# Patient Record
Sex: FEMALE | Race: WHITE
[De-identification: names, ages, dates, MRNs, and addresses within clinical notes are randomized per-mention and may not be internally consistent; named-entity substitution may affect disease eponyms.]

---

## 2019-12-05 ENCOUNTER — HOSPITAL ENCOUNTER (EMERGENCY)
Age: 59
Discharge: HOME | End: 2019-12-05
Payer: COMMERCIAL

## 2019-12-05 VITALS
OXYGEN SATURATION: 96 % | RESPIRATION RATE: 16 BRPM | DIASTOLIC BLOOD PRESSURE: 56 MMHG | TEMPERATURE: 98.06 F | SYSTOLIC BLOOD PRESSURE: 166 MMHG | HEART RATE: 83 BPM

## 2019-12-05 VITALS — RESPIRATION RATE: 16 BRPM

## 2019-12-05 VITALS — BODY MASS INDEX: 33.3 KG/M2
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
## 2019-12-05 DIAGNOSIS — S60.212A: Primary | ICD-10-CM
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
## 2019-12-05 PROCEDURE — 99283 EMERGENCY DEPT VISIT LOW MDM: CPT

## 2019-12-05 PROCEDURE — 73110 X-RAY EXAM OF WRIST: CPT

## 2023-03-17 ENCOUNTER — TELEPHONE (OUTPATIENT)
Dept: PRIMARY CARE | Facility: CLINIC | Age: 63
End: 2023-03-17
Payer: COMMERCIAL

## 2023-03-17 DIAGNOSIS — F32.A DEPRESSION, UNSPECIFIED DEPRESSION TYPE: ICD-10-CM

## 2023-03-17 DIAGNOSIS — F41.9 ANXIETY: ICD-10-CM

## 2023-03-17 PROBLEM — E78.5 HYPERLIPIDEMIA: Status: ACTIVE | Noted: 2023-03-17

## 2023-03-17 PROBLEM — E66.01 CLASS 2 SEVERE OBESITY WITH SERIOUS COMORBIDITY AND BODY MASS INDEX (BMI) OF 36.0 TO 36.9 IN ADULT (MULTI): Status: ACTIVE | Noted: 2023-03-17

## 2023-03-17 PROBLEM — L98.9 SKIN LESION OF CHEST WALL: Status: ACTIVE | Noted: 2023-03-17

## 2023-03-17 PROBLEM — S93.409A ANKLE SPRAIN: Status: ACTIVE | Noted: 2023-03-17

## 2023-03-17 PROBLEM — L30.0 NUMMULAR ECZEMA: Status: ACTIVE | Noted: 2023-03-17

## 2023-03-17 PROBLEM — R92.8 ABNORMAL MAMMOGRAM: Status: ACTIVE | Noted: 2023-03-17

## 2023-03-17 PROBLEM — K21.9 GERD (GASTROESOPHAGEAL REFLUX DISEASE): Status: ACTIVE | Noted: 2023-03-17

## 2023-03-17 PROBLEM — E66.812 CLASS 2 SEVERE OBESITY WITH SERIOUS COMORBIDITY AND BODY MASS INDEX (BMI) OF 36.0 TO 36.9 IN ADULT: Status: ACTIVE | Noted: 2023-03-17

## 2023-03-17 RX ORDER — DESVENLAFAXINE 50 MG/1
1 TABLET, EXTENDED RELEASE ORAL DAILY
COMMUNITY
Start: 2018-07-17 | End: 2023-03-22 | Stop reason: SDUPTHER

## 2023-03-17 RX ORDER — BUSPIRONE HYDROCHLORIDE 15 MG/1
1 TABLET ORAL 2 TIMES DAILY
COMMUNITY
Start: 2021-07-08 | End: 2023-03-17 | Stop reason: SDUPTHER

## 2023-03-17 RX ORDER — OMEPRAZOLE 20 MG/1
1 CAPSULE, DELAYED RELEASE ORAL DAILY
COMMUNITY
Start: 2019-06-21 | End: 2023-03-22 | Stop reason: SDUPTHER

## 2023-03-18 RX ORDER — BUSPIRONE HYDROCHLORIDE 15 MG/1
15 TABLET ORAL 2 TIMES DAILY
Qty: 60 TABLET | Refills: 0 | Status: SHIPPED | OUTPATIENT
Start: 2023-03-18 | End: 2023-03-22 | Stop reason: SDUPTHER

## 2023-03-22 ENCOUNTER — LAB (OUTPATIENT)
Dept: LAB | Facility: LAB | Age: 63
End: 2023-03-22
Payer: COMMERCIAL

## 2023-03-22 ENCOUNTER — OFFICE VISIT (OUTPATIENT)
Dept: PRIMARY CARE | Facility: CLINIC | Age: 63
End: 2023-03-22
Payer: COMMERCIAL

## 2023-03-22 VITALS
HEART RATE: 87 BPM | DIASTOLIC BLOOD PRESSURE: 78 MMHG | WEIGHT: 230 LBS | TEMPERATURE: 98.4 F | SYSTOLIC BLOOD PRESSURE: 130 MMHG

## 2023-03-22 DIAGNOSIS — F41.9 ANXIETY: ICD-10-CM

## 2023-03-22 DIAGNOSIS — F32.A DEPRESSION, UNSPECIFIED DEPRESSION TYPE: ICD-10-CM

## 2023-03-22 DIAGNOSIS — K21.9 GASTROESOPHAGEAL REFLUX DISEASE WITHOUT ESOPHAGITIS: Primary | ICD-10-CM

## 2023-03-22 DIAGNOSIS — Z12.31 BREAST CANCER SCREENING BY MAMMOGRAM: ICD-10-CM

## 2023-03-22 DIAGNOSIS — Z13.220 SCREENING CHOLESTEROL LEVEL: ICD-10-CM

## 2023-03-22 DIAGNOSIS — F33.42 RECURRENT MAJOR DEPRESSIVE DISORDER, IN FULL REMISSION (CMS-HCC): ICD-10-CM

## 2023-03-22 DIAGNOSIS — K21.9 GASTROESOPHAGEAL REFLUX DISEASE WITHOUT ESOPHAGITIS: ICD-10-CM

## 2023-03-22 PROCEDURE — 80061 LIPID PANEL: CPT

## 2023-03-22 PROCEDURE — 84443 ASSAY THYROID STIM HORMONE: CPT

## 2023-03-22 PROCEDURE — 99214 OFFICE O/P EST MOD 30 MIN: CPT | Performed by: FAMILY MEDICINE

## 2023-03-22 PROCEDURE — 36415 COLL VENOUS BLD VENIPUNCTURE: CPT

## 2023-03-22 PROCEDURE — 85025 COMPLETE CBC W/AUTO DIFF WBC: CPT

## 2023-03-22 PROCEDURE — 1036F TOBACCO NON-USER: CPT | Performed by: FAMILY MEDICINE

## 2023-03-22 PROCEDURE — 80053 COMPREHEN METABOLIC PANEL: CPT

## 2023-03-22 RX ORDER — DESVENLAFAXINE 50 MG/1
50 TABLET, EXTENDED RELEASE ORAL DAILY
Qty: 90 TABLET | Refills: 1 | Status: SHIPPED | OUTPATIENT
Start: 2023-03-22 | End: 2023-10-30 | Stop reason: SDUPTHER

## 2023-03-22 RX ORDER — BUSPIRONE HYDROCHLORIDE 15 MG/1
15 TABLET ORAL 2 TIMES DAILY
Qty: 180 TABLET | Refills: 1 | Status: SHIPPED | OUTPATIENT
Start: 2023-03-22 | End: 2023-09-18 | Stop reason: SDUPTHER

## 2023-03-22 RX ORDER — VENLAFAXINE 75 MG/1
75 TABLET ORAL DAILY
COMMUNITY
End: 2023-03-22 | Stop reason: ALTCHOICE

## 2023-03-22 RX ORDER — OMEPRAZOLE 20 MG/1
20 CAPSULE, DELAYED RELEASE ORAL DAILY
Qty: 90 CAPSULE | Refills: 1 | Status: SHIPPED | OUTPATIENT
Start: 2023-03-22 | End: 2023-09-18 | Stop reason: ALTCHOICE

## 2023-03-22 RX ORDER — DABIGATRAN ETEXILATE 150 MG/1
150 CAPSULE ORAL 2 TIMES DAILY
COMMUNITY
End: 2023-03-22 | Stop reason: ENTERED-IN-ERROR

## 2023-03-22 RX ORDER — IBUPROFEN 600 MG/1
1 TABLET ORAL EVERY 6 HOURS PRN
COMMUNITY
Start: 2022-05-16

## 2023-03-22 NOTE — PROGRESS NOTES
Subjective   Patient ID: Mia Estrella is a 62 y.o. female who presents for Follow-up (Present for follow up on depresssion and Gerd).  HPI  Feels well    Anxiety controlled    Does report episodes of dysphagia/food getting stuck.   Review of Systems  Genl: The patient has been in good health without recent weight change, fevers, or night sweats  CVS: No chest pain, irregular heartbeat, shortness of breath, or swollen ankles  Resp: No cough or difficulty breathing  GI: No change in bowel habits or abdominal pain. No heartburn  : No urinary problems.   Objective   Visit Vitals  /78   Pulse 87   Temp 36.9 °C (98.4 °F) (Oral)      Physical Exam  Alert, well-appearing    CVS RRR, no murmurs    Resp clear    Abd soft, NT, no masses or HSM   Assessment/Plan   Diagnoses and all orders for this visit:  Gastroesophageal reflux disease without esophagitis  -     CBC and Auto Differential; Future  -     Comprehensive Metabolic Panel; Future  -     omeprazole (PriLOSEC) 20 mg DR capsule; Take 1 capsule (20 mg) by mouth once daily.  Recurrent major depressive disorder, in full remission (CMS/HCC)  -     TSH with reflex to Free T4 if abnormal; Future  Anxiety  -     TSH with reflex to Free T4 if abnormal; Future  -     busPIRone (Buspar) 15 mg tablet; Take 1 tablet (15 mg) by mouth in the morning and 1 tablet (15 mg) before bedtime.  Screening cholesterol level  -     Lipid Panel; Future  Breast cancer screening by mammogram  -     BI mammo bilateral screening tomosynthesis; Future  Depression, unspecified depression type  -     busPIRone (Buspar) 15 mg tablet; Take 1 tablet (15 mg) by mouth in the morning and 1 tablet (15 mg) before bedtime.  -     desvenlafaxine 50 mg 24 hr tablet; Take 1 tablet (50 mg) by mouth once daily.    Referral to GI for further eval of dysphagia/GERD      Tanya Thurston MD  Family Medicine   [facility]

## 2023-03-23 LAB
ALANINE AMINOTRANSFERASE (SGPT) (U/L) IN SER/PLAS: 19 U/L (ref 7–45)
ALBUMIN (G/DL) IN SER/PLAS: 4.3 G/DL (ref 3.4–5)
ALKALINE PHOSPHATASE (U/L) IN SER/PLAS: 102 U/L (ref 33–136)
ANION GAP IN SER/PLAS: 12 MMOL/L (ref 10–20)
ASPARTATE AMINOTRANSFERASE (SGOT) (U/L) IN SER/PLAS: 19 U/L (ref 9–39)
BASOPHILS (10*3/UL) IN BLOOD BY AUTOMATED COUNT: 0.05 X10E9/L (ref 0–0.1)
BASOPHILS/100 LEUKOCYTES IN BLOOD BY AUTOMATED COUNT: 1 % (ref 0–2)
BILIRUBIN TOTAL (MG/DL) IN SER/PLAS: 0.5 MG/DL (ref 0–1.2)
CALCIUM (MG/DL) IN SER/PLAS: 9.5 MG/DL (ref 8.6–10.6)
CARBON DIOXIDE, TOTAL (MMOL/L) IN SER/PLAS: 31 MMOL/L (ref 21–32)
CHLORIDE (MMOL/L) IN SER/PLAS: 105 MMOL/L (ref 98–107)
CHOLESTEROL (MG/DL) IN SER/PLAS: 230 MG/DL (ref 0–199)
CHOLESTEROL IN HDL (MG/DL) IN SER/PLAS: 68.4 MG/DL
CHOLESTEROL/HDL RATIO: 3.4
CREATININE (MG/DL) IN SER/PLAS: 0.72 MG/DL (ref 0.5–1.05)
EOSINOPHILS (10*3/UL) IN BLOOD BY AUTOMATED COUNT: 0.12 X10E9/L (ref 0–0.7)
EOSINOPHILS/100 LEUKOCYTES IN BLOOD BY AUTOMATED COUNT: 2.3 % (ref 0–6)
ERYTHROCYTE DISTRIBUTION WIDTH (RATIO) BY AUTOMATED COUNT: 13 % (ref 11.5–14.5)
ERYTHROCYTE MEAN CORPUSCULAR HEMOGLOBIN CONCENTRATION (G/DL) BY AUTOMATED: 32.4 G/DL (ref 32–36)
ERYTHROCYTE MEAN CORPUSCULAR VOLUME (FL) BY AUTOMATED COUNT: 93 FL (ref 80–100)
ERYTHROCYTES (10*6/UL) IN BLOOD BY AUTOMATED COUNT: 4.42 X10E12/L (ref 4–5.2)
GFR FEMALE: >90 ML/MIN/1.73M2
GLUCOSE (MG/DL) IN SER/PLAS: 99 MG/DL (ref 74–99)
HEMATOCRIT (%) IN BLOOD BY AUTOMATED COUNT: 41.1 % (ref 36–46)
HEMOGLOBIN (G/DL) IN BLOOD: 13.3 G/DL (ref 12–16)
IMMATURE GRANULOCYTES/100 LEUKOCYTES IN BLOOD BY AUTOMATED COUNT: 0.4 % (ref 0–0.9)
LDL: 126 MG/DL (ref 0–99)
LEUKOCYTES (10*3/UL) IN BLOOD BY AUTOMATED COUNT: 5.3 X10E9/L (ref 4.4–11.3)
LYMPHOCYTES (10*3/UL) IN BLOOD BY AUTOMATED COUNT: 1.95 X10E9/L (ref 1.2–4.8)
LYMPHOCYTES/100 LEUKOCYTES IN BLOOD BY AUTOMATED COUNT: 37.1 % (ref 13–44)
MONOCYTES (10*3/UL) IN BLOOD BY AUTOMATED COUNT: 0.51 X10E9/L (ref 0.1–1)
MONOCYTES/100 LEUKOCYTES IN BLOOD BY AUTOMATED COUNT: 9.7 % (ref 2–10)
NEUTROPHILS (10*3/UL) IN BLOOD BY AUTOMATED COUNT: 2.6 X10E9/L (ref 1.2–7.7)
NEUTROPHILS/100 LEUKOCYTES IN BLOOD BY AUTOMATED COUNT: 49.5 % (ref 40–80)
NRBC (PER 100 WBCS) BY AUTOMATED COUNT: 0 /100 WBC (ref 0–0)
PLATELETS (10*3/UL) IN BLOOD AUTOMATED COUNT: 195 X10E9/L (ref 150–450)
POTASSIUM (MMOL/L) IN SER/PLAS: 4.2 MMOL/L (ref 3.5–5.3)
PROTEIN TOTAL: 7.1 G/DL (ref 6.4–8.2)
SODIUM (MMOL/L) IN SER/PLAS: 144 MMOL/L (ref 136–145)
THYROTROPIN (MIU/L) IN SER/PLAS BY DETECTION LIMIT <= 0.05 MIU/L: 1.59 MIU/L (ref 0.44–3.98)
TRIGLYCERIDE (MG/DL) IN SER/PLAS: 177 MG/DL (ref 0–149)
UREA NITROGEN (MG/DL) IN SER/PLAS: 10 MG/DL (ref 6–23)
VLDL: 35 MG/DL (ref 0–40)

## 2023-04-17 ENCOUNTER — OFFICE (OUTPATIENT)
Dept: URBAN - METROPOLITAN AREA CLINIC 27 | Facility: CLINIC | Age: 63
End: 2023-04-17

## 2023-04-17 VITALS
WEIGHT: 228 LBS | DIASTOLIC BLOOD PRESSURE: 73 MMHG | SYSTOLIC BLOOD PRESSURE: 139 MMHG | HEIGHT: 64 IN | TEMPERATURE: 97.7 F | HEART RATE: 71 BPM

## 2023-04-17 DIAGNOSIS — K21.9 GASTRO-ESOPHAGEAL REFLUX DISEASE WITHOUT ESOPHAGITIS: ICD-10-CM

## 2023-04-17 DIAGNOSIS — R13.10 DYSPHAGIA, UNSPECIFIED: ICD-10-CM

## 2023-04-17 PROCEDURE — 99204 OFFICE O/P NEW MOD 45 MIN: CPT | Performed by: INTERNAL MEDICINE

## 2023-04-18 ENCOUNTER — OFFICE (OUTPATIENT)
Dept: URBAN - METROPOLITAN AREA PATHOLOGY 2 | Facility: PATHOLOGY | Age: 63
End: 2023-04-18
Payer: COMMERCIAL

## 2023-04-18 ENCOUNTER — AMBULATORY SURGICAL CENTER (OUTPATIENT)
Dept: URBAN - METROPOLITAN AREA SURGERY 12 | Facility: SURGERY | Age: 63
End: 2023-04-18

## 2023-04-18 VITALS
OXYGEN SATURATION: 94 % | HEART RATE: 66 BPM | TEMPERATURE: 98.2 F | DIASTOLIC BLOOD PRESSURE: 80 MMHG | DIASTOLIC BLOOD PRESSURE: 79 MMHG | HEART RATE: 94 BPM | RESPIRATION RATE: 33 BRPM | RESPIRATION RATE: 20 BRPM | SYSTOLIC BLOOD PRESSURE: 135 MMHG | HEART RATE: 70 BPM | TEMPERATURE: 98.2 F | OXYGEN SATURATION: 56 % | HEART RATE: 79 BPM | SYSTOLIC BLOOD PRESSURE: 128 MMHG | DIASTOLIC BLOOD PRESSURE: 78 MMHG | DIASTOLIC BLOOD PRESSURE: 77 MMHG | HEART RATE: 77 BPM | SYSTOLIC BLOOD PRESSURE: 124 MMHG | OXYGEN SATURATION: 97 % | DIASTOLIC BLOOD PRESSURE: 66 MMHG | DIASTOLIC BLOOD PRESSURE: 47 MMHG | SYSTOLIC BLOOD PRESSURE: 135 MMHG | SYSTOLIC BLOOD PRESSURE: 158 MMHG | DIASTOLIC BLOOD PRESSURE: 77 MMHG | DIASTOLIC BLOOD PRESSURE: 66 MMHG | RESPIRATION RATE: 29 BRPM | RESPIRATION RATE: 13 BRPM | SYSTOLIC BLOOD PRESSURE: 128 MMHG | HEART RATE: 64 BPM | RESPIRATION RATE: 33 BRPM | RESPIRATION RATE: 18 BRPM | SYSTOLIC BLOOD PRESSURE: 130 MMHG | OXYGEN SATURATION: 83 % | SYSTOLIC BLOOD PRESSURE: 114 MMHG | OXYGEN SATURATION: 96 % | HEART RATE: 94 BPM | DIASTOLIC BLOOD PRESSURE: 51 MMHG | HEART RATE: 77 BPM | HEART RATE: 66 BPM | HEART RATE: 84 BPM | OXYGEN SATURATION: 94 % | HEART RATE: 70 BPM | HEART RATE: 83 BPM | SYSTOLIC BLOOD PRESSURE: 109 MMHG | SYSTOLIC BLOOD PRESSURE: 124 MMHG | WEIGHT: 226 LBS | HEART RATE: 94 BPM | HEART RATE: 64 BPM | OXYGEN SATURATION: 62 % | SYSTOLIC BLOOD PRESSURE: 114 MMHG | HEART RATE: 84 BPM | SYSTOLIC BLOOD PRESSURE: 130 MMHG | SYSTOLIC BLOOD PRESSURE: 114 MMHG | SYSTOLIC BLOOD PRESSURE: 133 MMHG | RESPIRATION RATE: 9 BRPM | RESPIRATION RATE: 9 BRPM | SYSTOLIC BLOOD PRESSURE: 130 MMHG | OXYGEN SATURATION: 56 % | DIASTOLIC BLOOD PRESSURE: 51 MMHG | OXYGEN SATURATION: 99 % | OXYGEN SATURATION: 92 % | HEART RATE: 83 BPM | RESPIRATION RATE: 20 BRPM | DIASTOLIC BLOOD PRESSURE: 79 MMHG | SYSTOLIC BLOOD PRESSURE: 109 MMHG | DIASTOLIC BLOOD PRESSURE: 80 MMHG | OXYGEN SATURATION: 83 % | WEIGHT: 226 LBS | DIASTOLIC BLOOD PRESSURE: 83 MMHG | HEART RATE: 97 BPM | SYSTOLIC BLOOD PRESSURE: 124 MMHG | DIASTOLIC BLOOD PRESSURE: 47 MMHG | OXYGEN SATURATION: 99 % | SYSTOLIC BLOOD PRESSURE: 148 MMHG | HEART RATE: 70 BPM | SYSTOLIC BLOOD PRESSURE: 137 MMHG | HEART RATE: 97 BPM | HEIGHT: 64 IN | DIASTOLIC BLOOD PRESSURE: 67 MMHG | RESPIRATION RATE: 20 BRPM | RESPIRATION RATE: 18 BRPM | WEIGHT: 226 LBS | DIASTOLIC BLOOD PRESSURE: 67 MMHG | OXYGEN SATURATION: 94 % | OXYGEN SATURATION: 96 % | DIASTOLIC BLOOD PRESSURE: 78 MMHG | OXYGEN SATURATION: 92 % | OXYGEN SATURATION: 62 % | DIASTOLIC BLOOD PRESSURE: 78 MMHG | RESPIRATION RATE: 29 BRPM | DIASTOLIC BLOOD PRESSURE: 80 MMHG | TEMPERATURE: 98.2 F | SYSTOLIC BLOOD PRESSURE: 133 MMHG | DIASTOLIC BLOOD PRESSURE: 77 MMHG | SYSTOLIC BLOOD PRESSURE: 137 MMHG | HEART RATE: 83 BPM | HEART RATE: 79 BPM | DIASTOLIC BLOOD PRESSURE: 83 MMHG | RESPIRATION RATE: 29 BRPM | HEIGHT: 64 IN | OXYGEN SATURATION: 99 % | OXYGEN SATURATION: 56 % | SYSTOLIC BLOOD PRESSURE: 135 MMHG | HEART RATE: 79 BPM | HEART RATE: 77 BPM | RESPIRATION RATE: 33 BRPM | DIASTOLIC BLOOD PRESSURE: 79 MMHG | RESPIRATION RATE: 10 BRPM | HEART RATE: 66 BPM | OXYGEN SATURATION: 92 % | OXYGEN SATURATION: 97 % | DIASTOLIC BLOOD PRESSURE: 67 MMHG | SYSTOLIC BLOOD PRESSURE: 128 MMHG | HEART RATE: 97 BPM | RESPIRATION RATE: 9 BRPM | HEIGHT: 64 IN | RESPIRATION RATE: 13 BRPM | SYSTOLIC BLOOD PRESSURE: 133 MMHG | RESPIRATION RATE: 10 BRPM | OXYGEN SATURATION: 83 % | OXYGEN SATURATION: 97 % | DIASTOLIC BLOOD PRESSURE: 83 MMHG | SYSTOLIC BLOOD PRESSURE: 137 MMHG | SYSTOLIC BLOOD PRESSURE: 109 MMHG | DIASTOLIC BLOOD PRESSURE: 66 MMHG | SYSTOLIC BLOOD PRESSURE: 158 MMHG | RESPIRATION RATE: 13 BRPM | RESPIRATION RATE: 10 BRPM | DIASTOLIC BLOOD PRESSURE: 51 MMHG | SYSTOLIC BLOOD PRESSURE: 148 MMHG | RESPIRATION RATE: 18 BRPM | HEART RATE: 64 BPM | SYSTOLIC BLOOD PRESSURE: 158 MMHG | OXYGEN SATURATION: 62 % | HEART RATE: 84 BPM | SYSTOLIC BLOOD PRESSURE: 148 MMHG | DIASTOLIC BLOOD PRESSURE: 47 MMHG | OXYGEN SATURATION: 96 %

## 2023-04-18 DIAGNOSIS — K22.2 ESOPHAGEAL OBSTRUCTION: ICD-10-CM

## 2023-04-18 DIAGNOSIS — K29.60 OTHER GASTRITIS WITHOUT BLEEDING: ICD-10-CM

## 2023-04-18 DIAGNOSIS — R13.10 DYSPHAGIA, UNSPECIFIED: ICD-10-CM

## 2023-04-18 DIAGNOSIS — K21.9 GASTRO-ESOPHAGEAL REFLUX DISEASE WITHOUT ESOPHAGITIS: ICD-10-CM

## 2023-04-18 DIAGNOSIS — K44.9 DIAPHRAGMATIC HERNIA WITHOUT OBSTRUCTION OR GANGRENE: ICD-10-CM

## 2023-04-18 PROBLEM — K22.89 OTHER SPECIFIED DISEASE OF ESOPHAGUS: Status: ACTIVE | Noted: 2023-04-18

## 2023-04-18 PROCEDURE — 43450 DILATE ESOPHAGUS 1/MULT PASS: CPT | Performed by: INTERNAL MEDICINE

## 2023-04-18 PROCEDURE — 43239 EGD BIOPSY SINGLE/MULTIPLE: CPT | Performed by: INTERNAL MEDICINE

## 2023-04-18 PROCEDURE — 88313 SPECIAL STAINS GROUP 2: CPT | Performed by: PATHOLOGY

## 2023-04-18 PROCEDURE — 88305 TISSUE EXAM BY PATHOLOGIST: CPT | Performed by: PATHOLOGY

## 2023-04-18 PROCEDURE — 88342 IMHCHEM/IMCYTCHM 1ST ANTB: CPT | Performed by: PATHOLOGY

## 2023-04-18 RX ORDER — PANTOPRAZOLE 40 MG/1
TABLET, DELAYED RELEASE ORAL
Qty: 30 | Refills: 1 | Status: ACTIVE

## 2023-09-18 ENCOUNTER — OFFICE VISIT (OUTPATIENT)
Dept: PRIMARY CARE | Facility: CLINIC | Age: 63
End: 2023-09-18
Payer: COMMERCIAL

## 2023-09-18 VITALS — TEMPERATURE: 98.4 F | SYSTOLIC BLOOD PRESSURE: 124 MMHG | DIASTOLIC BLOOD PRESSURE: 82 MMHG | WEIGHT: 227 LBS

## 2023-09-18 DIAGNOSIS — Z23 FLU VACCINE NEED: ICD-10-CM

## 2023-09-18 DIAGNOSIS — F41.9 ANXIETY: ICD-10-CM

## 2023-09-18 DIAGNOSIS — M25.562 ACUTE PAIN OF LEFT KNEE: Primary | ICD-10-CM

## 2023-09-18 DIAGNOSIS — F32.A DEPRESSION, UNSPECIFIED DEPRESSION TYPE: ICD-10-CM

## 2023-09-18 PROCEDURE — 90471 IMMUNIZATION ADMIN: CPT | Performed by: FAMILY MEDICINE

## 2023-09-18 PROCEDURE — 1036F TOBACCO NON-USER: CPT | Performed by: FAMILY MEDICINE

## 2023-09-18 PROCEDURE — 90686 IIV4 VACC NO PRSV 0.5 ML IM: CPT | Performed by: FAMILY MEDICINE

## 2023-09-18 PROCEDURE — 99213 OFFICE O/P EST LOW 20 MIN: CPT | Performed by: FAMILY MEDICINE

## 2023-09-18 RX ORDER — PANTOPRAZOLE SODIUM 40 MG/1
40 TABLET, DELAYED RELEASE ORAL
COMMUNITY
End: 2023-10-30 | Stop reason: SDUPTHER

## 2023-09-18 RX ORDER — SULINDAC 200 MG/1
200 TABLET ORAL 2 TIMES DAILY
Qty: 30 TABLET | Refills: 0 | Status: SHIPPED | OUTPATIENT
Start: 2023-09-18 | End: 2023-10-30 | Stop reason: ALTCHOICE

## 2023-09-18 RX ORDER — BUSPIRONE HYDROCHLORIDE 15 MG/1
15 TABLET ORAL 2 TIMES DAILY
Qty: 180 TABLET | Refills: 0 | Status: SHIPPED | OUTPATIENT
Start: 2023-09-18 | End: 2023-10-30 | Stop reason: SDUPTHER

## 2023-09-18 NOTE — PROGRESS NOTES
Subjective   Patient ID: Mia Estrella is a 62 y.o. female who presents for Knee Pain (EP. Here for Left knee pain for past 3 weeks. Has swelling and difficulty walking at times.).  HPI  C/o left knee pain- denies injury  Having hard time walking on it, is very swollen   Hurts with prolonged standing     Objective   Visit Vitals  /82   Temp 36.9 °C (98.4 °F) (Oral)      Physical Exam  Alert, well-appearing.    CVS: RRR, no murmurs.     Respiratory:  Clear and equal breath sounds.    MSK: Exam of knee unremarkable.   Assessment/Plan   Diagnoses and all orders for this visit:  Acute pain of left knee  -     sulindac (Clinoril) 200 mg tablet; Take 1 tablet (200 mg) by mouth 2 times a day.  Flu vaccine need  -     Flu vaccine (IIV4) age 6 months and greater, preservative free  Anxiety  -     busPIRone (Buspar) 15 mg tablet; Take 1 tablet (15 mg) by mouth 2 times a day.  Depression, unspecified depression type  -     busPIRone (Buspar) 15 mg tablet; Take 1 tablet (15 mg) by mouth 2 times a day.    Follow up with ortho that she saw ~ one year ago and go a cortisone injection    Follow up for AMARILIS Thurston MD  Family Medicine   [facility]

## 2023-09-20 ENCOUNTER — OFFICE (OUTPATIENT)
Dept: URBAN - METROPOLITAN AREA CLINIC 26 | Facility: CLINIC | Age: 63
End: 2023-09-20

## 2023-09-20 VITALS — WEIGHT: 227 LBS | HEIGHT: 64 IN | TEMPERATURE: 97.4 F

## 2023-09-20 DIAGNOSIS — K22.2 ESOPHAGEAL OBSTRUCTION: ICD-10-CM

## 2023-09-20 DIAGNOSIS — K21.9 GASTRO-ESOPHAGEAL REFLUX DISEASE WITHOUT ESOPHAGITIS: ICD-10-CM

## 2023-09-20 DIAGNOSIS — Z12.11 ENCOUNTER FOR SCREENING FOR MALIGNANT NEOPLASM OF COLON: ICD-10-CM

## 2023-09-20 DIAGNOSIS — R05.9 COUGH, UNSPECIFIED: ICD-10-CM

## 2023-09-20 PROCEDURE — 99214 OFFICE O/P EST MOD 30 MIN: CPT | Performed by: INTERNAL MEDICINE

## 2023-10-17 DIAGNOSIS — E78.5 HYPERLIPIDEMIA, UNSPECIFIED HYPERLIPIDEMIA TYPE: ICD-10-CM

## 2023-10-17 DIAGNOSIS — Z00.00 ANNUAL PHYSICAL EXAM: ICD-10-CM

## 2023-10-24 ENCOUNTER — LAB (OUTPATIENT)
Dept: LAB | Facility: LAB | Age: 63
End: 2023-10-24
Payer: COMMERCIAL

## 2023-10-24 DIAGNOSIS — E78.5 HYPERLIPIDEMIA, UNSPECIFIED HYPERLIPIDEMIA TYPE: ICD-10-CM

## 2023-10-24 DIAGNOSIS — Z00.00 ANNUAL PHYSICAL EXAM: ICD-10-CM

## 2023-10-24 PROCEDURE — 36415 COLL VENOUS BLD VENIPUNCTURE: CPT

## 2023-10-24 PROCEDURE — 84443 ASSAY THYROID STIM HORMONE: CPT

## 2023-10-24 PROCEDURE — 80061 LIPID PANEL: CPT

## 2023-10-24 PROCEDURE — 85025 COMPLETE CBC W/AUTO DIFF WBC: CPT

## 2023-10-24 PROCEDURE — 80053 COMPREHEN METABOLIC PANEL: CPT

## 2023-10-25 LAB
ALBUMIN SERPL BCP-MCNC: 4.3 G/DL (ref 3.4–5)
ALP SERPL-CCNC: 65 U/L (ref 33–136)
ALT SERPL W P-5'-P-CCNC: 22 U/L (ref 7–45)
ANION GAP SERPL CALC-SCNC: 12 MMOL/L (ref 10–20)
AST SERPL W P-5'-P-CCNC: 17 U/L (ref 9–39)
BASOPHILS # BLD AUTO: 0.04 X10*3/UL (ref 0–0.1)
BASOPHILS NFR BLD AUTO: 0.8 %
BILIRUB SERPL-MCNC: 0.5 MG/DL (ref 0–1.2)
BUN SERPL-MCNC: 16 MG/DL (ref 6–23)
CALCIUM SERPL-MCNC: 9.5 MG/DL (ref 8.6–10.6)
CHLORIDE SERPL-SCNC: 107 MMOL/L (ref 98–107)
CHOLEST SERPL-MCNC: 220 MG/DL (ref 0–199)
CHOLESTEROL/HDL RATIO: 3.1
CO2 SERPL-SCNC: 30 MMOL/L (ref 21–32)
CREAT SERPL-MCNC: 0.7 MG/DL (ref 0.5–1.05)
EOSINOPHIL # BLD AUTO: 0.1 X10*3/UL (ref 0–0.7)
EOSINOPHIL NFR BLD AUTO: 2 %
ERYTHROCYTE [DISTWIDTH] IN BLOOD BY AUTOMATED COUNT: 12.8 % (ref 11.5–14.5)
GFR SERPL CREATININE-BSD FRML MDRD: >90 ML/MIN/1.73M*2
GLUCOSE SERPL-MCNC: 100 MG/DL (ref 74–99)
HCT VFR BLD AUTO: 41.9 % (ref 36–46)
HDLC SERPL-MCNC: 72 MG/DL
HGB BLD-MCNC: 13.5 G/DL (ref 12–16)
IMM GRANULOCYTES # BLD AUTO: 0.01 X10*3/UL (ref 0–0.7)
IMM GRANULOCYTES NFR BLD AUTO: 0.2 % (ref 0–0.9)
LDLC SERPL CALC-MCNC: 121 MG/DL
LYMPHOCYTES # BLD AUTO: 1.78 X10*3/UL (ref 1.2–4.8)
LYMPHOCYTES NFR BLD AUTO: 35.5 %
MCH RBC QN AUTO: 31 PG (ref 26–34)
MCHC RBC AUTO-ENTMCNC: 32.2 G/DL (ref 32–36)
MCV RBC AUTO: 96 FL (ref 80–100)
MONOCYTES # BLD AUTO: 0.44 X10*3/UL (ref 0.1–1)
MONOCYTES NFR BLD AUTO: 8.8 %
NEUTROPHILS # BLD AUTO: 2.65 X10*3/UL (ref 1.2–7.7)
NEUTROPHILS NFR BLD AUTO: 52.7 %
NON HDL CHOLESTEROL: 148 MG/DL (ref 0–149)
NRBC BLD-RTO: 0 /100 WBCS (ref 0–0)
PLATELET # BLD AUTO: 203 X10*3/UL (ref 150–450)
PMV BLD AUTO: 11.4 FL (ref 7.5–11.5)
POTASSIUM SERPL-SCNC: 4.6 MMOL/L (ref 3.5–5.3)
PROT SERPL-MCNC: 7.2 G/DL (ref 6.4–8.2)
RBC # BLD AUTO: 4.36 X10*6/UL (ref 4–5.2)
SODIUM SERPL-SCNC: 144 MMOL/L (ref 136–145)
TRIGL SERPL-MCNC: 137 MG/DL (ref 0–149)
TSH SERPL-ACNC: 1.51 MIU/L (ref 0.44–3.98)
VLDL: 27 MG/DL (ref 0–40)
WBC # BLD AUTO: 5 X10*3/UL (ref 4.4–11.3)

## 2023-10-30 ENCOUNTER — OFFICE VISIT (OUTPATIENT)
Dept: PRIMARY CARE | Facility: CLINIC | Age: 63
End: 2023-10-30
Payer: COMMERCIAL

## 2023-10-30 VITALS — WEIGHT: 227 LBS | DIASTOLIC BLOOD PRESSURE: 82 MMHG | TEMPERATURE: 98.6 F | SYSTOLIC BLOOD PRESSURE: 124 MMHG

## 2023-10-30 DIAGNOSIS — K21.9 GASTROESOPHAGEAL REFLUX DISEASE WITHOUT ESOPHAGITIS: ICD-10-CM

## 2023-10-30 DIAGNOSIS — Z12.31 BREAST CANCER SCREENING BY MAMMOGRAM: ICD-10-CM

## 2023-10-30 DIAGNOSIS — Z12.4 CERVICAL CANCER SCREENING: ICD-10-CM

## 2023-10-30 DIAGNOSIS — Z00.00 HEALTH MAINTENANCE EXAMINATION: Primary | ICD-10-CM

## 2023-10-30 DIAGNOSIS — F41.9 ANXIETY: ICD-10-CM

## 2023-10-30 DIAGNOSIS — Z12.4 ENCOUNTER FOR PAPANICOLAOU SMEAR FOR CERVICAL CANCER SCREENING: ICD-10-CM

## 2023-10-30 DIAGNOSIS — F32.A DEPRESSION, UNSPECIFIED DEPRESSION TYPE: ICD-10-CM

## 2023-10-30 PROCEDURE — 1036F TOBACCO NON-USER: CPT | Performed by: FAMILY MEDICINE

## 2023-10-30 PROCEDURE — 88175 CYTOPATH C/V AUTO FLUID REDO: CPT

## 2023-10-30 PROCEDURE — 99396 PREV VISIT EST AGE 40-64: CPT | Performed by: FAMILY MEDICINE

## 2023-10-30 RX ORDER — PANTOPRAZOLE SODIUM 40 MG/1
40 TABLET, DELAYED RELEASE ORAL
Qty: 90 TABLET | Refills: 1 | Status: SHIPPED | OUTPATIENT
Start: 2023-10-30

## 2023-10-30 RX ORDER — DESVENLAFAXINE 50 MG/1
50 TABLET, EXTENDED RELEASE ORAL DAILY
Qty: 90 TABLET | Refills: 1 | Status: SHIPPED | OUTPATIENT
Start: 2023-10-30

## 2023-10-30 RX ORDER — BUSPIRONE HYDROCHLORIDE 15 MG/1
15 TABLET ORAL 2 TIMES DAILY
Qty: 180 TABLET | Refills: 1 | Status: SHIPPED | OUTPATIENT
Start: 2023-10-30

## 2023-10-30 NOTE — PROGRESS NOTES
Subjective   Patient ID: Mia Estrella is a 62 y.o. female who presents for Well Woman Exam (EP. Here for WWE/PAP).  HPI  Feels well, no specific complaints or concerns today.    Saw ortho and had cortisone injection left knee     Has colonoscopy scheduled for December Overdue for mammogram   Review of Systems  General: no fever or night sweats, no change in weight  Eyes: no vision disturbance  ENT: no mouth lesions, no sore throat, and no dysphagia  CV: no chest pain, no palpitations, no lower extremity edema  Resp: no shortness of breath, no cough  GI: no abdominal pain, no change in bowel habits  : no urinary problems  MSK: no arthralgias, myalgias, or back pain  Skin; no rashes or new/changed skin lesions  Neuro: no headaches     Objective   Visit Vitals  /82   Temp 37 °C (98.6 °F) (Oral)      Physical Exam  Appears well.     HEENT: OP clear. Sclera white. PERRL. EACs and TMs clear.     Neck: supple, no masses, or lymphadenopathy. No thyromegaly.     CVS: RRR. No murmurs. No peripheral edema.    Lungs: clear with normal inspirations and expirations.    Breasts: Symmetrical, no masses, no skin retraction or dimpling. No nipple discharge. No axillary nodes.    Abd: soft, NT, no masses or HSM.    Pelvic: No vulvar lesions. No vaginal lesions or discharge. Normal appearing cervix with no lesions. No adnexal masses. Normal uterus.    Skin: No suspicious lesions.    Assessment/Plan   Diagnoses and all orders for this visit:  Health maintenance examination  Anxiety  -     busPIRone (Buspar) 15 mg tablet; Take 1 tablet (15 mg) by mouth 2 times a day.  -     desvenlafaxine 50 mg 24 hr tablet; Take 1 tablet (50 mg) by mouth once daily.  Depression, unspecified depression type  -     busPIRone (Buspar) 15 mg tablet; Take 1 tablet (15 mg) by mouth 2 times a day.  -     desvenlafaxine 50 mg 24 hr tablet; Take 1 tablet (50 mg) by mouth once daily.  Gastroesophageal reflux disease without esophagitis  -      pantoprazole (ProtoNix) 40 mg EC tablet; Take 1 tablet (40 mg) by mouth once daily in the morning. Take before meals. Do not crush, chew, or split.  Breast cancer screening by mammogram  -     BI mammo bilateral screening tomosynthesis; Future      Follow low chol diet       Tanya Thurston MD  Family Medicine   [facility]

## 2023-11-13 LAB
CYTOLOGY CMNT CVX/VAG CYTO-IMP: NORMAL
LAB AP HPV GENOTYPE QUESTION: YES
LAB AP HPV HR: NORMAL
LABORATORY COMMENT REPORT: NORMAL
PATH REPORT.TOTAL CANCER: NORMAL

## 2023-11-28 VITALS
DIASTOLIC BLOOD PRESSURE: 43 MMHG | HEART RATE: 72 BPM | SYSTOLIC BLOOD PRESSURE: 100 MMHG | OXYGEN SATURATION: 90 % | SYSTOLIC BLOOD PRESSURE: 155 MMHG | SYSTOLIC BLOOD PRESSURE: 98 MMHG | OXYGEN SATURATION: 99 % | DIASTOLIC BLOOD PRESSURE: 49 MMHG | HEART RATE: 61 BPM | TEMPERATURE: 97.4 F | HEIGHT: 64 IN | DIASTOLIC BLOOD PRESSURE: 49 MMHG | OXYGEN SATURATION: 96 % | SYSTOLIC BLOOD PRESSURE: 88 MMHG | RESPIRATION RATE: 12 BRPM | DIASTOLIC BLOOD PRESSURE: 76 MMHG | DIASTOLIC BLOOD PRESSURE: 44 MMHG | DIASTOLIC BLOOD PRESSURE: 60 MMHG | HEART RATE: 73 BPM | OXYGEN SATURATION: 91 % | DIASTOLIC BLOOD PRESSURE: 46 MMHG | OXYGEN SATURATION: 89 % | DIASTOLIC BLOOD PRESSURE: 46 MMHG | HEART RATE: 64 BPM | OXYGEN SATURATION: 100 % | SYSTOLIC BLOOD PRESSURE: 94 MMHG | HEART RATE: 64 BPM | RESPIRATION RATE: 23 BRPM | DIASTOLIC BLOOD PRESSURE: 53 MMHG | HEART RATE: 71 BPM | DIASTOLIC BLOOD PRESSURE: 53 MMHG | DIASTOLIC BLOOD PRESSURE: 43 MMHG | SYSTOLIC BLOOD PRESSURE: 109 MMHG | HEART RATE: 70 BPM | OXYGEN SATURATION: 100 % | SYSTOLIC BLOOD PRESSURE: 126 MMHG | RESPIRATION RATE: 13 BRPM | HEART RATE: 64 BPM | RESPIRATION RATE: 10 BRPM | RESPIRATION RATE: 18 BRPM | DIASTOLIC BLOOD PRESSURE: 43 MMHG | DIASTOLIC BLOOD PRESSURE: 89 MMHG | HEART RATE: 68 BPM | RESPIRATION RATE: 19 BRPM | RESPIRATION RATE: 12 BRPM | RESPIRATION RATE: 9 BRPM | DIASTOLIC BLOOD PRESSURE: 35 MMHG | SYSTOLIC BLOOD PRESSURE: 126 MMHG | SYSTOLIC BLOOD PRESSURE: 73 MMHG | OXYGEN SATURATION: 94 % | DIASTOLIC BLOOD PRESSURE: 35 MMHG | SYSTOLIC BLOOD PRESSURE: 155 MMHG | OXYGEN SATURATION: 91 % | SYSTOLIC BLOOD PRESSURE: 88 MMHG | DIASTOLIC BLOOD PRESSURE: 68 MMHG | SYSTOLIC BLOOD PRESSURE: 96 MMHG | WEIGHT: 230 LBS | DIASTOLIC BLOOD PRESSURE: 53 MMHG | RESPIRATION RATE: 13 BRPM | SYSTOLIC BLOOD PRESSURE: 94 MMHG | RESPIRATION RATE: 10 BRPM | WEIGHT: 230 LBS | RESPIRATION RATE: 13 BRPM | OXYGEN SATURATION: 92 % | HEART RATE: 68 BPM | DIASTOLIC BLOOD PRESSURE: 68 MMHG | OXYGEN SATURATION: 90 % | SYSTOLIC BLOOD PRESSURE: 137 MMHG | DIASTOLIC BLOOD PRESSURE: 89 MMHG | RESPIRATION RATE: 23 BRPM | DIASTOLIC BLOOD PRESSURE: 60 MMHG | SYSTOLIC BLOOD PRESSURE: 109 MMHG | OXYGEN SATURATION: 89 % | OXYGEN SATURATION: 99 % | DIASTOLIC BLOOD PRESSURE: 49 MMHG | RESPIRATION RATE: 14 BRPM | HEART RATE: 72 BPM | RESPIRATION RATE: 18 BRPM | OXYGEN SATURATION: 92 % | SYSTOLIC BLOOD PRESSURE: 98 MMHG | OXYGEN SATURATION: 93 % | DIASTOLIC BLOOD PRESSURE: 76 MMHG | RESPIRATION RATE: 19 BRPM | DIASTOLIC BLOOD PRESSURE: 60 MMHG | SYSTOLIC BLOOD PRESSURE: 109 MMHG | SYSTOLIC BLOOD PRESSURE: 79 MMHG | HEART RATE: 56 BPM | SYSTOLIC BLOOD PRESSURE: 100 MMHG | SYSTOLIC BLOOD PRESSURE: 85 MMHG | WEIGHT: 230 LBS | DIASTOLIC BLOOD PRESSURE: 36 MMHG | HEART RATE: 70 BPM | RESPIRATION RATE: 9 BRPM | HEART RATE: 65 BPM | DIASTOLIC BLOOD PRESSURE: 76 MMHG | DIASTOLIC BLOOD PRESSURE: 89 MMHG | OXYGEN SATURATION: 100 % | RESPIRATION RATE: 23 BRPM | SYSTOLIC BLOOD PRESSURE: 137 MMHG | OXYGEN SATURATION: 98 % | SYSTOLIC BLOOD PRESSURE: 96 MMHG | HEART RATE: 56 BPM | OXYGEN SATURATION: 98 % | OXYGEN SATURATION: 89 % | HEIGHT: 64 IN | SYSTOLIC BLOOD PRESSURE: 100 MMHG | RESPIRATION RATE: 11 BRPM | HEART RATE: 61 BPM | HEART RATE: 68 BPM | SYSTOLIC BLOOD PRESSURE: 73 MMHG | SYSTOLIC BLOOD PRESSURE: 96 MMHG | DIASTOLIC BLOOD PRESSURE: 44 MMHG | SYSTOLIC BLOOD PRESSURE: 88 MMHG | SYSTOLIC BLOOD PRESSURE: 126 MMHG | OXYGEN SATURATION: 94 % | DIASTOLIC BLOOD PRESSURE: 46 MMHG | OXYGEN SATURATION: 98 % | SYSTOLIC BLOOD PRESSURE: 155 MMHG | DIASTOLIC BLOOD PRESSURE: 35 MMHG | SYSTOLIC BLOOD PRESSURE: 94 MMHG | HEART RATE: 71 BPM | HEART RATE: 70 BPM | SYSTOLIC BLOOD PRESSURE: 98 MMHG | HEART RATE: 69 BPM | OXYGEN SATURATION: 96 % | SYSTOLIC BLOOD PRESSURE: 79 MMHG | OXYGEN SATURATION: 93 % | OXYGEN SATURATION: 93 % | HEART RATE: 71 BPM | HEART RATE: 72 BPM | RESPIRATION RATE: 14 BRPM | DIASTOLIC BLOOD PRESSURE: 44 MMHG | RESPIRATION RATE: 19 BRPM | SYSTOLIC BLOOD PRESSURE: 85 MMHG | SYSTOLIC BLOOD PRESSURE: 73 MMHG | OXYGEN SATURATION: 92 % | RESPIRATION RATE: 11 BRPM | DIASTOLIC BLOOD PRESSURE: 36 MMHG | HEART RATE: 73 BPM | OXYGEN SATURATION: 90 % | RESPIRATION RATE: 12 BRPM | HEART RATE: 65 BPM | RESPIRATION RATE: 11 BRPM | OXYGEN SATURATION: 96 % | HEART RATE: 65 BPM | DIASTOLIC BLOOD PRESSURE: 36 MMHG | HEART RATE: 69 BPM | OXYGEN SATURATION: 94 % | HEART RATE: 56 BPM | OXYGEN SATURATION: 99 % | RESPIRATION RATE: 10 BRPM | SYSTOLIC BLOOD PRESSURE: 85 MMHG | RESPIRATION RATE: 18 BRPM | SYSTOLIC BLOOD PRESSURE: 79 MMHG | RESPIRATION RATE: 9 BRPM | TEMPERATURE: 97.4 F | HEIGHT: 64 IN | DIASTOLIC BLOOD PRESSURE: 68 MMHG | RESPIRATION RATE: 14 BRPM | SYSTOLIC BLOOD PRESSURE: 137 MMHG | OXYGEN SATURATION: 91 % | HEART RATE: 69 BPM | HEART RATE: 61 BPM | HEART RATE: 73 BPM | TEMPERATURE: 97.4 F

## 2023-12-04 ENCOUNTER — AMBULATORY SURGICAL CENTER (OUTPATIENT)
Dept: URBAN - METROPOLITAN AREA SURGERY 12 | Facility: SURGERY | Age: 63
End: 2023-12-04
Payer: COMMERCIAL

## 2023-12-04 DIAGNOSIS — K64.8 OTHER HEMORRHOIDS: ICD-10-CM

## 2023-12-04 DIAGNOSIS — K57.30 DIVERTICULOSIS OF LARGE INTESTINE WITHOUT PERFORATION OR ABS: ICD-10-CM

## 2023-12-04 DIAGNOSIS — K64.4 RESIDUAL HEMORRHOIDAL SKIN TAGS: ICD-10-CM

## 2023-12-04 PROCEDURE — 45378 DIAGNOSTIC COLONOSCOPY: CPT | Performed by: INTERNAL MEDICINE

## 2023-12-14 ENCOUNTER — OFFICE VISIT (OUTPATIENT)
Dept: PRIMARY CARE | Facility: CLINIC | Age: 63
End: 2023-12-14
Payer: COMMERCIAL

## 2023-12-14 VITALS — DIASTOLIC BLOOD PRESSURE: 78 MMHG | WEIGHT: 224 LBS | TEMPERATURE: 98.4 F | SYSTOLIC BLOOD PRESSURE: 124 MMHG

## 2023-12-14 DIAGNOSIS — M79.604 PAIN OF RIGHT LOWER EXTREMITY: Primary | ICD-10-CM

## 2023-12-14 PROCEDURE — 1036F TOBACCO NON-USER: CPT | Performed by: FAMILY MEDICINE

## 2023-12-14 PROCEDURE — 99213 OFFICE O/P EST LOW 20 MIN: CPT | Performed by: FAMILY MEDICINE

## 2023-12-14 RX ORDER — SULINDAC 200 MG/1
200 TABLET ORAL 2 TIMES DAILY
Qty: 30 TABLET | Refills: 0 | Status: SHIPPED | OUTPATIENT
Start: 2023-12-14 | End: 2024-02-12

## 2023-12-14 NOTE — PROGRESS NOTES
"Subjective   Patient ID: Mia Estrella is a 63 y.o. female who presents for Leg Pain (EP. Here for Right leg pain and extreme burning sensation.  States difficulty walking on it.).  HPI  Right leg swells- \"shoes get tight\", burning sensation and tingling right leg, x 5 days  Denies any injury  Been taking ibuprofen 600 mg bid  Better the last couple of days because she has been off work ([employer])  Review of Systems  Genl: The patient has been in good health without recent weight change, fevers, or night sweats  CVS: No chest pain, irregular heartbeat, or shortness of breath.  Resp: No cough or difficulty breathing  GI: No change in bowel habits or abdominal pain.   : No urinary problems.    Objective   Visit Vitals  /78   Temp 36.9 °C (98.4 °F) (Oral)      Physical Exam  Alert, well-appearing.    CVS: RRR, no murmurs. No peripheral edema.     Respiratory: Normal inspirations and expirations. Clear and equal breath sounds.    MSK: exam of right lower leg unremarkable, negative Severino sign     Assessment/Plan   Diagnoses and all orders for this visit:  Pain of right lower extremity  -     sulindac (Clinoril) 200 mg tablet; Take 1 tablet (200 mg) by mouth 2 times a day.    To call in a few days if not better       Tanya Thurston MD  Family Medicine   [facility]  "

## 2024-01-04 ENCOUNTER — APPOINTMENT (OUTPATIENT)
Dept: RADIOLOGY | Facility: CLINIC | Age: 64
End: 2024-01-04
Payer: COMMERCIAL

## 2024-02-26 NOTE — TELEPHONE ENCOUNTER
LM for pt on VM to verify pharmacy.  ermelinda  
Patient called back and said that she needs the script to go to [facility].  She is totally out of the medication.   
Pt called requesting Rx refill on   Buspirone 15 mg (1)  Send to [facility]  Scheduled 3/22/23  
Sent RX request to Dr. Thurston for approval.  Sent to  [facility] as noted in chart.  ermelinda  
When Should The Patient Follow-Up For Their Next Full-Body Skin Exam?: 6 Months
Detail Level: Detailed
Quality 137: Melanoma: Continuity Of Care - Recall System: Patient information entered into a recall system that includes: target date for the next exam specified AND a process to follow up with patients regarding missed or unscheduled appointments
Detail Level: Zone

## 2024-06-13 ENCOUNTER — OFFICE VISIT (OUTPATIENT)
Dept: PRIMARY CARE | Facility: CLINIC | Age: 64
End: 2024-06-13
Payer: COMMERCIAL

## 2024-06-13 VITALS
HEART RATE: 65 BPM | SYSTOLIC BLOOD PRESSURE: 124 MMHG | WEIGHT: 237 LBS | TEMPERATURE: 97.9 F | OXYGEN SATURATION: 97 % | DIASTOLIC BLOOD PRESSURE: 78 MMHG

## 2024-06-13 DIAGNOSIS — Z00.00 HEALTH MAINTENANCE EXAMINATION: ICD-10-CM

## 2024-06-13 DIAGNOSIS — F41.9 ANXIETY: Primary | ICD-10-CM

## 2024-06-13 DIAGNOSIS — E78.2 MIXED HYPERLIPIDEMIA: ICD-10-CM

## 2024-06-13 DIAGNOSIS — F32.A DEPRESSION, UNSPECIFIED DEPRESSION TYPE: ICD-10-CM

## 2024-06-13 PROCEDURE — 1036F TOBACCO NON-USER: CPT | Performed by: FAMILY MEDICINE

## 2024-06-13 PROCEDURE — 99213 OFFICE O/P EST LOW 20 MIN: CPT | Performed by: FAMILY MEDICINE

## 2024-06-13 RX ORDER — DESVENLAFAXINE 50 MG/1
50 TABLET, EXTENDED RELEASE ORAL DAILY
Qty: 90 TABLET | Refills: 1 | Status: SHIPPED | OUTPATIENT
Start: 2024-06-13

## 2024-06-13 RX ORDER — BUSPIRONE HYDROCHLORIDE 15 MG/1
15 TABLET ORAL 2 TIMES DAILY
Qty: 180 TABLET | Refills: 1 | Status: SHIPPED | OUTPATIENT
Start: 2024-06-13

## 2024-06-13 NOTE — PROGRESS NOTES
Subjective   Patient ID: Mia Estrella is a 63 y.o. female who presents for No chief complaint on file..  HPI  Feels well, no specific complaints or concerns today.    C/o anxiety lately, not sure why, new job for 2 mos, denies stress     Had colonoscopy 12/2023  Never did mammogram   Review of Systems  Genl: The patient has been in good health without recent weight change, fevers, or night sweats  CVS: No chest pain, irregular heartbeat, shortness of breath, or swollen ankles  Resp: No cough or difficulty breathing  GI: No change in bowel habits or abdominal pain. No heartburn  : No urinary problems.   Objective   /78   Pulse 65   Temp 36.6 °C (97.9 °F) (Oral)   Wt 108 kg (237 lb)   SpO2 97%    Physical Exam  Alert, well-appearing.    Neck: Supple. No palpable masses. Thyroid was not enlarged.    CVS: RRR, no murmurs. No peripheral edema.    Respiratory: Clear and equal breath sounds.    GI: Soft, nontender, no masses or hepatosplenomegaly.     MSK: toe of concern: hammertoe and callus on the top  Assessment/Plan   Diagnoses and all orders for this visit:  Anxiety  -     busPIRone (Buspar) 15 mg tablet; Take 1 tablet (15 mg) by mouth 2 times a day.  -     desvenlafaxine 50 mg 24 hr tablet; Take 1 tablet (50 mg) by mouth once daily.  Depression, unspecified depression type  -     busPIRone (Buspar) 15 mg tablet; Take 1 tablet (15 mg) by mouth 2 times a day.  -     desvenlafaxine 50 mg 24 hr tablet; Take 1 tablet (50 mg) by mouth once daily.  Health maintenance examination  -     Follow Up In [facility] - PCP - Health Maintenance; Future  Mixed hyperlipidemia    Without insurance right now she wants to wait on doing bw until the Fall     Encouraged her to consider counselling       Tanya Thurston MD  Family Medicine   [facility]

## 2024-12-06 DIAGNOSIS — E78.2 MIXED HYPERLIPIDEMIA: ICD-10-CM

## 2024-12-06 DIAGNOSIS — F41.9 ANXIETY: ICD-10-CM

## 2024-12-06 DIAGNOSIS — E66.01 CLASS 2 SEVERE OBESITY WITH SERIOUS COMORBIDITY AND BODY MASS INDEX (BMI) OF 36.0 TO 36.9 IN ADULT, UNSPECIFIED OBESITY TYPE: ICD-10-CM

## 2024-12-06 DIAGNOSIS — F32.A DEPRESSION, UNSPECIFIED DEPRESSION TYPE: ICD-10-CM

## 2024-12-06 DIAGNOSIS — E66.812 CLASS 2 SEVERE OBESITY WITH SERIOUS COMORBIDITY AND BODY MASS INDEX (BMI) OF 36.0 TO 36.9 IN ADULT, UNSPECIFIED OBESITY TYPE: ICD-10-CM

## 2024-12-06 RX ORDER — BUSPIRONE HYDROCHLORIDE 15 MG/1
15 TABLET ORAL 2 TIMES DAILY
Qty: 180 TABLET | Refills: 1 | Status: SHIPPED | OUTPATIENT
Start: 2024-12-06

## 2024-12-06 NOTE — TELEPHONE ENCOUNTER
Spoke to patient and advised of lab orders placed for 12-16-24 apt. Asking for refill on Buspirone. Has 5 pills left.

## 2024-12-11 ENCOUNTER — LAB (OUTPATIENT)
Dept: LAB | Facility: LAB | Age: 64
End: 2024-12-11
Payer: COMMERCIAL

## 2024-12-11 DIAGNOSIS — E66.812 CLASS 2 SEVERE OBESITY WITH SERIOUS COMORBIDITY AND BODY MASS INDEX (BMI) OF 36.0 TO 36.9 IN ADULT, UNSPECIFIED OBESITY TYPE: ICD-10-CM

## 2024-12-11 DIAGNOSIS — E78.2 MIXED HYPERLIPIDEMIA: ICD-10-CM

## 2024-12-11 DIAGNOSIS — Z12.31 BREAST CANCER SCREENING BY MAMMOGRAM: ICD-10-CM

## 2024-12-11 DIAGNOSIS — Z12.31 ENCOUNTER FOR SCREENING MAMMOGRAM FOR MALIGNANT NEOPLASM OF BREAST: ICD-10-CM

## 2024-12-11 DIAGNOSIS — E66.01 CLASS 2 SEVERE OBESITY WITH SERIOUS COMORBIDITY AND BODY MASS INDEX (BMI) OF 36.0 TO 36.9 IN ADULT, UNSPECIFIED OBESITY TYPE: ICD-10-CM

## 2024-12-11 PROCEDURE — 84443 ASSAY THYROID STIM HORMONE: CPT

## 2024-12-11 PROCEDURE — 84439 ASSAY OF FREE THYROXINE: CPT

## 2024-12-11 PROCEDURE — 80053 COMPREHEN METABOLIC PANEL: CPT

## 2024-12-11 PROCEDURE — 80061 LIPID PANEL: CPT

## 2024-12-11 PROCEDURE — 85025 COMPLETE CBC W/AUTO DIFF WBC: CPT

## 2024-12-11 PROCEDURE — 36415 COLL VENOUS BLD VENIPUNCTURE: CPT

## 2024-12-12 LAB
ALBUMIN SERPL BCP-MCNC: 4.3 G/DL (ref 3.4–5)
ALP SERPL-CCNC: 69 U/L (ref 33–136)
ALT SERPL W P-5'-P-CCNC: 22 U/L (ref 7–45)
ANION GAP SERPL CALC-SCNC: 14 MMOL/L (ref 10–20)
AST SERPL W P-5'-P-CCNC: 34 U/L (ref 9–39)
BASOPHILS # BLD AUTO: 0.05 X10*3/UL (ref 0–0.1)
BASOPHILS NFR BLD AUTO: 1 %
BILIRUB SERPL-MCNC: 0.6 MG/DL (ref 0–1.2)
BUN SERPL-MCNC: 19 MG/DL (ref 6–23)
CALCIUM SERPL-MCNC: 9.1 MG/DL (ref 8.6–10.6)
CHLORIDE SERPL-SCNC: 104 MMOL/L (ref 98–107)
CHOLEST SERPL-MCNC: 215 MG/DL (ref 0–199)
CHOLESTEROL/HDL RATIO: 3.3
CO2 SERPL-SCNC: 27 MMOL/L (ref 21–32)
CREAT SERPL-MCNC: 0.59 MG/DL (ref 0.5–1.05)
EGFRCR SERPLBLD CKD-EPI 2021: >90 ML/MIN/1.73M*2
EOSINOPHIL # BLD AUTO: 0.14 X10*3/UL (ref 0–0.7)
EOSINOPHIL NFR BLD AUTO: 2.8 %
ERYTHROCYTE [DISTWIDTH] IN BLOOD BY AUTOMATED COUNT: 12.7 % (ref 11.5–14.5)
GLUCOSE SERPL-MCNC: 91 MG/DL (ref 74–99)
HCT VFR BLD AUTO: 41.9 % (ref 36–46)
HDLC SERPL-MCNC: 66.1 MG/DL
HGB BLD-MCNC: 13.3 G/DL (ref 12–16)
IMM GRANULOCYTES # BLD AUTO: 0.01 X10*3/UL (ref 0–0.7)
IMM GRANULOCYTES NFR BLD AUTO: 0.2 % (ref 0–0.9)
LDLC SERPL CALC-MCNC: 121 MG/DL
LYMPHOCYTES # BLD AUTO: 2.39 X10*3/UL (ref 1.2–4.8)
LYMPHOCYTES NFR BLD AUTO: 47.1 %
MCH RBC QN AUTO: 30 PG (ref 26–34)
MCHC RBC AUTO-ENTMCNC: 31.7 G/DL (ref 32–36)
MCV RBC AUTO: 95 FL (ref 80–100)
MONOCYTES # BLD AUTO: 0.5 X10*3/UL (ref 0.1–1)
MONOCYTES NFR BLD AUTO: 9.9 %
NEUTROPHILS # BLD AUTO: 1.98 X10*3/UL (ref 1.2–7.7)
NEUTROPHILS NFR BLD AUTO: 39 %
NON HDL CHOLESTEROL: 149 MG/DL (ref 0–149)
NRBC BLD-RTO: 0 /100 WBCS (ref 0–0)
PLATELET # BLD AUTO: 174 X10*3/UL (ref 150–450)
POTASSIUM SERPL-SCNC: 5.4 MMOL/L (ref 3.5–5.3)
PROT SERPL-MCNC: 7.3 G/DL (ref 6.4–8.2)
RBC # BLD AUTO: 4.43 X10*6/UL (ref 4–5.2)
SODIUM SERPL-SCNC: 140 MMOL/L (ref 136–145)
T4 FREE SERPL-MCNC: 1.2 NG/DL (ref 0.78–1.48)
TRIGL SERPL-MCNC: 140 MG/DL (ref 0–149)
TSH SERPL-ACNC: 4.56 MIU/L (ref 0.44–3.98)
VLDL: 28 MG/DL (ref 0–40)
WBC # BLD AUTO: 5.1 X10*3/UL (ref 4.4–11.3)

## 2024-12-13 ENCOUNTER — APPOINTMENT (OUTPATIENT)
Dept: PRIMARY CARE | Facility: CLINIC | Age: 64
End: 2024-12-13
Payer: COMMERCIAL

## 2024-12-16 ENCOUNTER — APPOINTMENT (OUTPATIENT)
Dept: PRIMARY CARE | Facility: CLINIC | Age: 64
End: 2024-12-16

## 2024-12-16 VITALS
TEMPERATURE: 98.4 F | SYSTOLIC BLOOD PRESSURE: 124 MMHG | DIASTOLIC BLOOD PRESSURE: 72 MMHG | HEIGHT: 65 IN | WEIGHT: 228 LBS | OXYGEN SATURATION: 95 % | BODY MASS INDEX: 37.99 KG/M2 | HEART RATE: 65 BPM

## 2024-12-16 DIAGNOSIS — Z12.4 CERVICAL CANCER SCREENING: ICD-10-CM

## 2024-12-16 DIAGNOSIS — K21.9 GASTROESOPHAGEAL REFLUX DISEASE WITHOUT ESOPHAGITIS: ICD-10-CM

## 2024-12-16 DIAGNOSIS — Z12.4 ENCOUNTER FOR PAPANICOLAOU SMEAR FOR CERVICAL CANCER SCREENING: ICD-10-CM

## 2024-12-16 DIAGNOSIS — F32.A DEPRESSION, UNSPECIFIED DEPRESSION TYPE: ICD-10-CM

## 2024-12-16 DIAGNOSIS — F41.9 ANXIETY: ICD-10-CM

## 2024-12-16 DIAGNOSIS — Z00.00 HEALTH MAINTENANCE EXAMINATION: Primary | ICD-10-CM

## 2024-12-16 PROCEDURE — 88175 CYTOPATH C/V AUTO FLUID REDO: CPT

## 2024-12-16 PROCEDURE — 3008F BODY MASS INDEX DOCD: CPT | Performed by: FAMILY MEDICINE

## 2024-12-16 PROCEDURE — 99396 PREV VISIT EST AGE 40-64: CPT | Performed by: FAMILY MEDICINE

## 2024-12-16 PROCEDURE — 1036F TOBACCO NON-USER: CPT | Performed by: FAMILY MEDICINE

## 2024-12-16 RX ORDER — DESVENLAFAXINE 50 MG/1
50 TABLET, EXTENDED RELEASE ORAL DAILY
Qty: 90 TABLET | Refills: 1 | Status: SHIPPED | OUTPATIENT
Start: 2024-12-16

## 2024-12-16 RX ORDER — PANTOPRAZOLE SODIUM 40 MG/1
40 TABLET, DELAYED RELEASE ORAL
Qty: 90 TABLET | Refills: 1 | Status: SHIPPED | OUTPATIENT
Start: 2024-12-16

## 2024-12-16 RX ORDER — BUSPIRONE HYDROCHLORIDE 15 MG/1
15 TABLET ORAL 2 TIMES DAILY
Qty: 180 TABLET | Refills: 1 | Status: SHIPPED | OUTPATIENT
Start: 2024-12-16

## 2024-12-16 NOTE — PROGRESS NOTES
"Subjective   Patient ID: Mia Estrella is a 64 y.o. female who presents for Well Woman Exam  (EP. Here for WWE and Pap. Discuss back pain./).  HPI  Feels well, no specific complaints or concerns today.    Review of Systems  General: no fever or night sweats, no change in weight  Eyes: no vision disturbance  ENT: no hearing loss, no hoarseness, no mouth lesions, no sore throat, and no dysphagia  CV: no chest pain, no palpitations, no lower extremity edema  Resp: no shortness of breath, no cough  GI: no abdominal pain, no change in bowel habits  : no urinary problems  MSK: no arthralgias, myalgias, or back pain  Skin; no rashes or new/changed skin lesions  Neuro: no headache, no difficulty walking     Objective   /72 (BP Location: Left arm)   Pulse 65   Temp 36.9 °C (98.4 °F) (Oral)   Ht 1.638 m (5' 4.5\")   Wt 103 kg (228 lb)   SpO2 95%   BMI 38.53 kg/m²    Physical Exam  Appears well.     HEENT: OP clear. Sclera white. PERRL. EACs and TMs clear.     Neck: supple, no masses, or lymphadenopathy. No thyromegaly.     CVS: RRR. No murmurs. No peripheral edema.    Lungs: clear with normal inspirations and expirations.    Breasts: Symmetrical, no masses, no skin retraction or dimpling. No nipple discharge. No axillary nodes.    Abd: soft, NT, no masses or HSM.    Pelvic: No vulvar lesions. No vaginal lesions or discharge. Normal appearing cervix with no lesions. No adnexal masses. Normal uterus.    Skin: No suspicious lesions.      Reviewed recent labs  Assessment/Plan   Diagnoses and all orders for this visit:  Health maintenance examination  Depression, unspecified depression type  -     busPIRone (Buspar) 15 mg tablet; Take 1 tablet (15 mg) by mouth 2 times a day.  -     desvenlafaxine 50 mg 24 hr tablet; Take 1 tablet (50 mg) by mouth once daily.  Anxiety  -     busPIRone (Buspar) 15 mg tablet; Take 1 tablet (15 mg) by mouth 2 times a day.  -     desvenlafaxine 50 mg 24 hr tablet; Take 1 tablet (50 mg) by " mouth once daily.  Gastroesophageal reflux disease without esophagitis  -     pantoprazole (ProtoNix) 40 mg EC tablet; Take 1 tablet (40 mg) by mouth once daily in the morning. Take before meals. Do not crush, chew, or split.  Encounter for Papanicolaou smear for cervical cancer screening  -     THINPREP PAP TEST  Cervical cancer screening  -     THINPREP PAP TEST    Encouraged her to schedule mammogram     Tanya Thurston MD  Family Medicine   [facility]

## 2025-01-07 ENCOUNTER — HOSPITAL ENCOUNTER (OUTPATIENT)
Dept: RADIOLOGY | Facility: CLINIC | Age: 65
Discharge: HOME | End: 2025-01-07
Payer: COMMERCIAL

## 2025-01-07 VITALS — WEIGHT: 227.07 LBS | BODY MASS INDEX: 38.77 KG/M2 | HEIGHT: 64 IN

## 2025-01-07 DIAGNOSIS — Z12.31 ENCOUNTER FOR SCREENING MAMMOGRAM FOR MALIGNANT NEOPLASM OF BREAST: ICD-10-CM

## 2025-01-07 DIAGNOSIS — Z12.31 BREAST CANCER SCREENING BY MAMMOGRAM: ICD-10-CM

## 2025-01-07 PROCEDURE — 77067 SCR MAMMO BI INCL CAD: CPT

## 2025-01-07 PROCEDURE — 77063 BREAST TOMOSYNTHESIS BI: CPT | Performed by: STUDENT IN AN ORGANIZED HEALTH CARE EDUCATION/TRAINING PROGRAM

## 2025-01-07 PROCEDURE — 77067 SCR MAMMO BI INCL CAD: CPT | Performed by: STUDENT IN AN ORGANIZED HEALTH CARE EDUCATION/TRAINING PROGRAM

## 2025-03-14 ENCOUNTER — OFFICE VISIT (OUTPATIENT)
Dept: PRIMARY CARE | Facility: CLINIC | Age: 65
End: 2025-03-14
Payer: COMMERCIAL

## 2025-03-14 VITALS
TEMPERATURE: 99.1 F | WEIGHT: 229.3 LBS | BODY MASS INDEX: 38.2 KG/M2 | HEIGHT: 65 IN | SYSTOLIC BLOOD PRESSURE: 142 MMHG | DIASTOLIC BLOOD PRESSURE: 90 MMHG | RESPIRATION RATE: 20 BRPM | OXYGEN SATURATION: 98 % | HEART RATE: 76 BPM

## 2025-03-14 DIAGNOSIS — F41.9 ANXIETY: Primary | ICD-10-CM

## 2025-03-14 PROCEDURE — 3008F BODY MASS INDEX DOCD: CPT | Performed by: FAMILY MEDICINE

## 2025-03-14 PROCEDURE — 99213 OFFICE O/P EST LOW 20 MIN: CPT | Performed by: FAMILY MEDICINE

## 2025-03-14 PROCEDURE — 1036F TOBACCO NON-USER: CPT | Performed by: FAMILY MEDICINE

## 2025-03-14 RX ORDER — BUSPIRONE HYDROCHLORIDE 15 MG/1
15 TABLET ORAL 3 TIMES DAILY
Qty: 90 TABLET | Refills: 0 | Status: SHIPPED | OUTPATIENT
Start: 2025-03-14 | End: 2026-03-14

## 2025-03-14 ASSESSMENT — PATIENT HEALTH QUESTIONNAIRE - PHQ9
2. FEELING DOWN, DEPRESSED OR HOPELESS: MORE THAN HALF THE DAYS
SUM OF ALL RESPONSES TO PHQ QUESTIONS 1-9: 10
7. TROUBLE CONCENTRATING ON THINGS, SUCH AS READING THE NEWSPAPER OR WATCHING TELEVISION: MORE THAN HALF THE DAYS
10. IF YOU CHECKED OFF ANY PROBLEMS, HOW DIFFICULT HAVE THESE PROBLEMS MADE IT FOR YOU TO DO YOUR WORK, TAKE CARE OF THINGS AT HOME, OR GET ALONG WITH OTHER PEOPLE: EXTREMELY DIFFICULT
8. MOVING OR SPEAKING SO SLOWLY THAT OTHER PEOPLE COULD HAVE NOTICED. OR THE OPPOSITE, BEING SO FIGETY OR RESTLESS THAT YOU HAVE BEEN MOVING AROUND A LOT MORE THAN USUAL: NOT AT ALL
6. FEELING BAD ABOUT YOURSELF - OR THAT YOU ARE A FAILURE OR HAVE LET YOURSELF OR YOUR FAMILY DOWN: MORE THAN HALF THE DAYS
5. POOR APPETITE OR OVEREATING: NOT AT ALL
3. TROUBLE FALLING OR STAYING ASLEEP OR SLEEPING TOO MUCH: NOT AT ALL
4. FEELING TIRED OR HAVING LITTLE ENERGY: MORE THAN HALF THE DAYS
9. THOUGHTS THAT YOU WOULD BE BETTER OFF DEAD, OR OF HURTING YOURSELF: NOT AT ALL
1. LITTLE INTEREST OR PLEASURE IN DOING THINGS: MORE THAN HALF THE DAYS
SUM OF ALL RESPONSES TO PHQ9 QUESTIONS 1 AND 2: 4

## 2025-03-14 ASSESSMENT — ENCOUNTER SYMPTOMS
OCCASIONAL FEELINGS OF UNSTEADINESS: 0
LOSS OF SENSATION IN FEET: 0
DEPRESSION: 0

## 2025-03-14 NOTE — PROGRESS NOTES
"Subjective   Patient ID: Mia Estrella is a 64 y.o. female who presents for Follow-up (EPV, Follow-up Major anxiety x 1 week (since her return to work)  /Needs return to work note for 3/13/25 & 3/14/2025).  HPI  Increased anxiety x 1 week- not sure why   Reports was off work for ~ 1 month due to pelvic/hip injury (occurred at home while moving some furniture)  Been at job x 6 mos- likes it there    Takes Buspar 15 mg bid and desvenlafaxine 50 mg daily     Sleeping ok  No thoughts of hurting self    Objective   /90 (BP Location: Right arm, Patient Position: Sitting)   Pulse 76   Temp 37.3 °C (99.1 °F) (Oral)   Resp 20   Ht 1.648 m (5' 4.9\")   Wt 104 kg (229 lb 4.8 oz)   SpO2 98%   BMI 38.28 kg/m²        Assessment/Plan   Diagnoses and all orders for this visit:  Anxiety  -     busPIRone (Buspar) 15 mg tablet; Take 1 tablet (15 mg) by mouth 3 times a day.  -     Follow Up In [facility] - Behavioral Health Collaborative Care [facility]; Future      Follow up 3-4 weeks     Tanya Thurston MD  Family Medicine   [facility]  "

## 2025-03-20 ENCOUNTER — SOCIAL WORK (OUTPATIENT)
Dept: PRIMARY CARE | Facility: CLINIC | Age: 65
End: 2025-03-20
Payer: COMMERCIAL

## 2025-03-20 ASSESSMENT — PATIENT HEALTH QUESTIONNAIRE - PHQ9
SUM OF ALL RESPONSES TO PHQ9 QUESTIONS 1 & 2: 4
10. IF YOU CHECKED OFF ANY PROBLEMS, HOW DIFFICULT HAVE THESE PROBLEMS MADE IT FOR YOU TO DO YOUR WORK, TAKE CARE OF THINGS AT HOME, OR GET ALONG WITH OTHER PEOPLE: SOMEWHAT DIFFICULT
SUM OF ALL RESPONSES TO PHQ QUESTIONS 1-9: 9
7. TROUBLE CONCENTRATING ON THINGS, SUCH AS READING THE NEWSPAPER OR WATCHING TELEVISION: MORE THAN HALF THE DAYS
2. FEELING DOWN, DEPRESSED OR HOPELESS: MORE THAN HALF THE DAYS
5. POOR APPETITE OR OVEREATING: NOT AT ALL
4. FEELING TIRED OR HAVING LITTLE ENERGY: SEVERAL DAYS
3. TROUBLE FALLING OR STAYING ASLEEP: NOT AT ALL
9. THOUGHTS THAT YOU WOULD BE BETTER OFF DEAD, OR OF HURTING YOURSELF: NOT AT ALL
6. FEELING BAD ABOUT YOURSELF - OR THAT YOU ARE A FAILURE OR HAVE LET YOURSELF OR YOUR FAMILY DOWN: MORE THAN HALF THE DAYS
1. LITTLE INTEREST OR PLEASURE IN DOING THINGS: MORE THAN HALF THE DAYS
8. MOVING OR SPEAKING SO SLOWLY THAT OTHER PEOPLE COULD HAVE NOTICED. OR THE OPPOSITE, BEING SO FIGETY OR RESTLESS THAT YOU HAVE BEEN MOVING AROUND A LOT MORE THAN USUAL: NOT AT ALL

## 2025-03-20 ASSESSMENT — ANXIETY QUESTIONNAIRES
IF YOU CHECKED OFF ANY PROBLEMS ON THIS QUESTIONNAIRE, HOW DIFFICULT HAVE THESE PROBLEMS MADE IT FOR YOU TO DO YOUR WORK, TAKE CARE OF THINGS AT HOME, OR GET ALONG WITH OTHER PEOPLE: SOMEWHAT DIFFICULT
7. FEELING AFRAID AS IF SOMETHING AWFUL MIGHT HAPPEN: NOT AT ALL
5. BEING SO RESTLESS THAT IT IS HARD TO SIT STILL: SEVERAL DAYS
3. WORRYING TOO MUCH ABOUT DIFFERENT THINGS: MORE THAN HALF THE DAYS
4. TROUBLE RELAXING: NOT AT ALL
6. BECOMING EASILY ANNOYED OR IRRITABLE: NOT AT ALL
GAD7 TOTAL SCORE: 7
2. NOT BEING ABLE TO STOP OR CONTROL WORRYING: MORE THAN HALF THE DAYS
1. FEELING NERVOUS, ANXIOUS, OR ON EDGE: MORE THAN HALF THE DAYS

## 2025-03-20 NOTE — PROGRESS NOTES
Collaborative Care ([facility]) Initial Assessment    Session Time  Start: 10:11 am   End: 11:11 am      Collaborative Care program information (including case discussion with psychiatry, involvement of [facility] and billing when applicable) was provided and discussed with the patient. Patient Indicated understanding and agreed to proceed.   Confirm: Yes    Patient Health Questionnaire-9 Score: 9 (3/20/2025 10:35 AM)  PERLITA-7 Total Score: 7 (3/20/2025 10:35 AM)    Reason for Visit / Chief Complaint  Chief Complaint   Patient presents with    Depression    Anxiety   Patient is a 64 year old  female who was referred to collaborative care by Dr. Tanya Thurston for symptoms of anxiety and depression. Patient reported that a close friend lost her  in January after he became ill at her house and  on the way to the hospital. Patient reported that her symptoms of depression and anxiety started 6 or 7 years ago. Patient stated her symptoms got worse when she was off of work after she got hurt. She reports no trouble with sleep or appetite. She reported she is prescribed 50 mg of angelina venlafaxine /Pristiq and Buspirone / Bu spur 15 mg- 3 times a day. She stated that the medication is helpful at this time. She stated she is interest in counseling for her depression, anxiety and grief.     Accompanied by: Self  Guardian Status: Self  Caregiver Status: Does not have a caregiver    Review of Symptoms    Sleep   Average Hours Sleep in/Night: 8- 10 hours per night   Prepares Self for Sleep at Time: 1:00 am   Usual Wake up Time: 11:00 am - 12:00 pm   Sleep Symptoms: none, denies  Sleep Hygiene: good sleep rituals, uses electronics/phone, consistent daily routine, and consistent sleep/wake time    Mood   Symptom Onset/Duration:  6-7 years ago.   Current Sx: little interest/pleasure doing things, feeling down, feeling depressed, feeling hopeless, feeling tired/little energy, feeling bad about self, feeling misunderstood, feeling  like failure, trouble concentrating, and crying spells  Triggers: situation(s) and event(s)  Past Sx: little interest/pleasure doing things, feeling down, feeling depressed, feeling tired/little energy, low motivation, feeling bad about self, feeling misunderstood, feeling like failure, trouble concentrating, and crying spells    Anxiety   Symptom Onset/Duration:  6-7 years ago   Current Sx: feeling nervous/anxious/on edge, difficulty stopping/controlling worry, worrying too much, trouble relaxing, and feeling fidgety/restless  Panic / Somatic Sx: none  Triggers: situation(s) and event(s)  Past Sx: feeling nervous/anxious/on edge, difficulty stopping/controlling worry, worrying too much, trouble relaxing, and feeling fidgety/restless    Self-Esteem / Self-Image   Self Esteem Rating (1-10 Scale, 10 being high): 7  Self-Esteem / Self Image Sx: able to identify strength, positive attitude towards self, good self-confidence, good perception of self, good self-actualization, feels has good qualities, respects self, and feels worthy    Appetite   Description of Overall Appetite: denied any concerns  Eating Behaviors: eats balanced meals and prepares meals  Concerns with appetite: none, denied    Anger / Irritability  Symptoms of Anger / Irritability: none, denied     Communication / Self Expression  Communication Style & Concerns: able to express self/emotions    Trauma    Symptoms Onset/Duration: symptoms more than one month  Traumatic Experiences: traumatic grief- good friend's  (they all worked together) he  in an ambulance on the way from her house to the hospital.    Current Symptoms Related to Traumatic Experience: intrusive thoughts/images, reliving stressful experience, strong physical reactions, and decreased interest/maria r  Triggers: situation(s) and place(s)    Grief / Loss / Adjustment   Symptom Onset/Duration: 6 months or less  Current Sx: depressed mood, tearfulness, and anxious mood  Factors of  Grief / Loss / Adjustment: loss of loved one(s), new job/position, and loss of job    Hallucinations / Delusions   Hallucinations & Delusions Experienced: none, denied    Learning Concerns / Memory   Learning Concerns & Sx: none, denied  Memory Concerns & Sx: none, denied    Functional impairment   Impacting ADL's: no impairment   Impacting IADL's: No impairment  Impacting Ability : No impairment    Associated Medical Concerns   Potential Associated Factors: hyperlipidemia and GERD and eczema      Comprehensive Behavioral Health History     Medications  Current Mental Health Medications:   Buspar / buspirone; Dose: 45 mg ; Side effects: None, denied  Pristiq / desvenlafaxine; Dose: 50 mg ; Side effects: None, denied    Past Mental Health Medications:   Effexor / venlafaxine; Dose: 150 mg ; Side effects: not working   Xanax / alprazolam; Dose: .5 mg ; Side effects: None, denied    Concerns / challenges / barriers with taking medications? No concerns    Open to medication recommendations from consulting psychiatrist? Yes    Do you ever forget to take your medication? No  If yes, how often? NA    Mental Health Treatment History  Mental Health Treatment: NA  Reason/When/Where/Outcome: NA    Risk History  Suicidal Thoughts/Method/Intent/Plan: None, denied  Suicide Attempts/Preparations: None, denied  Number of Suicide Attempts: 0  Access to Firearms/Lethal Means: No guns in home  Non-Suicidal Self Injury: None, denied  Last Saunders Risk Score: No Risk    Protective Factors: good protective factors, strong protective factors, hopefulness/future orientation, social support/connectedness, generative employment, community support, Yazidism affiliation/spirituality, good coping skills, sense of responsibility towards family, positive family relationships, and marriage/partnership    Violence: None, denied  Homicidal Thoughts/Method/Plan/Intent: None, denied  Homicidal Attempts/Preparations: None, denied  Number of Attempts:  0      Substance Use History    Substances    Social History     Substance and Sexual Activity   Alcohol Use Never     Social History     Substance and Sexual Activity   Drug Use Never       Substance Current Use   NA No                   Addiction Treatment   NA      Family History    Mental Health / Conditions    Family Member Condition / Diagnosis Medications / Side Effects   Daughter  Bipolar None/Unknown                    Substance Use    Family Member Substance Current Use   N/A NA No                      History of Suicide    Family Member Details   N/A NA           Social History    Housing   Living Situation: lives with spouse and lives in house  Safe Housing Conditions / Feels Safe in Home: Yes    Employment  Current Employment: employed and full-time  Current Concerns/Challenges: No    Income   Current Concerns/Challenges: No  Receive Benefits/Assistance: No    Education   Status / Level of Education: Completed high school    Legal   Legal Considerations: None, denied    Relationships   S/O:     Parents/Guardian: mother -living. Father   Siblings: twin sister   Friends: yes   Other: 1 daughter and 1 son        Active Duty? No  Are you a ? No    Sexuality / Gender   Concerns with Sexuality/Gender: None, denied  Sexual Orientation: heterosexual    Preferred Gender Pronouns / Identity: She/her/hers    Transportation   Transportation Concerns: active 's license and has vehicle    Temple/ Spirituality   Are you Mormon or Spiritual: Yes  Temple / Practice: Attends [employer]  Spiritual Practice: seeks meaning/purpose in life, feeling at peace, sense of purposefulness, feeling content, practices gratitude, and acceptance of self    Coping / Strengths / Supports   Coping:  prayer, talking with someone, and focus   Strengths: dependable, good listener, loving, and patient  Supports: Spouse and friend       Abuse History  Physical Abuse: No  Sexual Abuse: No  Verbal /  Emotional Abuse / Bullying (+Cyber): No   Financial Abuse: No  Domestic Violence: No    Assessment Summary  / Plan    Assessment Summary:  What do you want to work on/get out of collaborative care? To be able to cope.     Plan:   Psych consult - ongoing and bi-weekly    Follow up in about 2 weeks (around 4/3/2025).    Provisional Findings / Impressions  Primary: F41.9- Anxiety     Secondary: F32.A- Depression     Goals  Patient wants to learn coping skills to manage anxiety and depression.   Care Plan    There is no care plan documentation to display.

## 2025-03-24 ENCOUNTER — DOCUMENTATION (OUTPATIENT)
Dept: BEHAVIORAL HEALTH | Facility: HOSPITAL | Age: 65
End: 2025-03-24
Payer: COMMERCIAL

## 2025-03-24 DIAGNOSIS — F41.9 ANXIETY: Primary | ICD-10-CM

## 2025-03-24 NOTE — PROGRESS NOTES
[facility] Psychiatry Consult Note     Mia Estrella is a 64 y.o., referred to Collaborative Care for symptoms of depression and anxiety. I have reviewed the patient with the behavioral health manager and reviewed the patient's electronic record.    Recommendations:   - continue current medications at current dose - improving.  - if needed, could increase Pristiq to 100 mg daily  - M to continue with grief counseling and development of coping skills.                                    ------------------------    Anxiety and depression x 6 years, worse now since 2025, since friend of hers  after becoming ill in patient's house. - patient feels guilty, sad.     Feeling better with medication changes.    Medical issues: GERD    Current psychiatric medications:  Buspar 15 mg TID (just increased 2 weeks ago)  Pristiq 50 mg daily        Patient Health Questionnaire-9 Score: 9 (3/20/2025 10:35 AM)  PERLITA-7 Total Score: 7 (3/20/2025 10:35 AM)      The above treatment considerations and suggestions are based on consultations with the patient's care manager and a review of information available in the electronic medical record. I have not personally examined the patient. All recommendations should be implemented with consideration of the patient's relevant prior history and current clinical status. Please feel free to call me with any questions about the care of this patient. I can easily be reached through [email or web address] or at [email or web address]

## 2025-03-25 DIAGNOSIS — Z13.29 SCREENING FOR THYROID DISORDER: ICD-10-CM

## 2025-03-25 DIAGNOSIS — R79.9 ABNORMAL BLOOD CHEMISTRY: ICD-10-CM

## 2025-03-25 DIAGNOSIS — E78.5 HYPERLIPIDEMIA, UNSPECIFIED HYPERLIPIDEMIA TYPE: ICD-10-CM

## 2025-03-25 DIAGNOSIS — R79.89 ABNORMAL CBC: ICD-10-CM

## 2025-03-28 ENCOUNTER — DOCUMENTATION (OUTPATIENT)
Dept: PRIMARY CARE | Facility: CLINIC | Age: 65
End: 2025-03-28
Payer: COMMERCIAL

## 2025-03-28 DIAGNOSIS — F41.9 ANXIETY: Primary | ICD-10-CM

## 2025-03-28 DIAGNOSIS — F33.42 RECURRENT MAJOR DEPRESSIVE DISORDER, IN FULL REMISSION: ICD-10-CM

## 2025-03-28 DIAGNOSIS — F32.9 MAJOR DEPRESSIVE DISORDER WITH CURRENT ACTIVE EPISODE, UNSPECIFIED DEPRESSION EPISODE SEVERITY, UNSPECIFIED WHETHER RECURRENT: ICD-10-CM

## 2025-04-01 ENCOUNTER — APPOINTMENT (OUTPATIENT)
Dept: PRIMARY CARE | Facility: CLINIC | Age: 65
End: 2025-04-01
Payer: COMMERCIAL

## 2025-04-01 DIAGNOSIS — F41.9 ANXIETY: Primary | ICD-10-CM

## 2025-04-01 DIAGNOSIS — F33.42 RECURRENT MAJOR DEPRESSIVE DISORDER, IN FULL REMISSION: ICD-10-CM

## 2025-04-01 LAB
ALBUMIN SERPL-MCNC: 4.3 G/DL (ref 3.6–5.1)
ALP SERPL-CCNC: 78 U/L (ref 37–153)
ALT SERPL-CCNC: 20 U/L (ref 6–29)
ANION GAP SERPL CALCULATED.4IONS-SCNC: 10 MMOL/L (CALC) (ref 7–17)
AST SERPL-CCNC: 18 U/L (ref 10–35)
BASOPHILS # BLD AUTO: 32 CELLS/UL (ref 0–200)
BASOPHILS NFR BLD AUTO: 0.7 %
BILIRUB SERPL-MCNC: 0.5 MG/DL (ref 0.2–1.2)
BUN SERPL-MCNC: 12 MG/DL (ref 7–25)
CALCIUM SERPL-MCNC: 9.2 MG/DL (ref 8.6–10.4)
CHLORIDE SERPL-SCNC: 105 MMOL/L (ref 98–110)
CHOLEST SERPL-MCNC: 211 MG/DL
CHOLEST/HDLC SERPL: 3.1 (CALC)
CO2 SERPL-SCNC: 26 MMOL/L (ref 20–32)
CREAT SERPL-MCNC: 0.63 MG/DL (ref 0.5–1.05)
EGFRCR SERPLBLD CKD-EPI 2021: 100 ML/MIN/1.73M2
EOSINOPHIL # BLD AUTO: 81 CELLS/UL (ref 15–500)
EOSINOPHIL NFR BLD AUTO: 1.8 %
ERYTHROCYTE [DISTWIDTH] IN BLOOD BY AUTOMATED COUNT: 12.9 % (ref 11–15)
GLUCOSE SERPL-MCNC: 95 MG/DL (ref 65–99)
HCT VFR BLD AUTO: 39.9 % (ref 35–45)
HDLC SERPL-MCNC: 68 MG/DL
HGB BLD-MCNC: 13.3 G/DL (ref 11.7–15.5)
LDLC SERPL CALC-MCNC: 115 MG/DL (CALC)
LYMPHOCYTES # BLD AUTO: 2237 CELLS/UL (ref 850–3900)
LYMPHOCYTES NFR BLD AUTO: 49.7 %
MCH RBC QN AUTO: 30.8 PG (ref 27–33)
MCHC RBC AUTO-ENTMCNC: 33.3 G/DL (ref 32–36)
MCV RBC AUTO: 92.4 FL (ref 80–100)
MONOCYTES # BLD AUTO: 338 CELLS/UL (ref 200–950)
MONOCYTES NFR BLD AUTO: 7.5 %
NEUTROPHILS # BLD AUTO: 1814 CELLS/UL (ref 1500–7800)
NEUTROPHILS NFR BLD AUTO: 40.3 %
NONHDLC SERPL-MCNC: 143 MG/DL (CALC)
PLATELET # BLD AUTO: 200 THOUSAND/UL (ref 140–400)
PMV BLD REES-ECKER: 11 FL (ref 7.5–12.5)
POTASSIUM SERPL-SCNC: 4 MMOL/L (ref 3.5–5.3)
PROT SERPL-MCNC: 7 G/DL (ref 6.1–8.1)
RBC # BLD AUTO: 4.32 MILLION/UL (ref 3.8–5.1)
SODIUM SERPL-SCNC: 141 MMOL/L (ref 135–146)
TRIGL SERPL-MCNC: 162 MG/DL
TSH SERPL-ACNC: 2.91 MIU/L (ref 0.4–4.5)
WBC # BLD AUTO: 4.5 THOUSAND/UL (ref 3.8–10.8)

## 2025-04-01 ASSESSMENT — ANXIETY QUESTIONNAIRES
GAD7 TOTAL SCORE: 6
6. BECOMING EASILY ANNOYED OR IRRITABLE: SEVERAL DAYS
4. TROUBLE RELAXING: MORE THAN HALF THE DAYS
IF YOU CHECKED OFF ANY PROBLEMS ON THIS QUESTIONNAIRE, HOW DIFFICULT HAVE THESE PROBLEMS MADE IT FOR YOU TO DO YOUR WORK, TAKE CARE OF THINGS AT HOME, OR GET ALONG WITH OTHER PEOPLE: NOT DIFFICULT AT ALL
5. BEING SO RESTLESS THAT IT IS HARD TO SIT STILL: NOT AT ALL
2. NOT BEING ABLE TO STOP OR CONTROL WORRYING: SEVERAL DAYS
7. FEELING AFRAID AS IF SOMETHING AWFUL MIGHT HAPPEN: SEVERAL DAYS
3. WORRYING TOO MUCH ABOUT DIFFERENT THINGS: NOT AT ALL
1. FEELING NERVOUS, ANXIOUS, OR ON EDGE: SEVERAL DAYS

## 2025-04-01 ASSESSMENT — PATIENT HEALTH QUESTIONNAIRE - PHQ9
6. FEELING BAD ABOUT YOURSELF - OR THAT YOU ARE A FAILURE OR HAVE LET YOURSELF OR YOUR FAMILY DOWN: SEVERAL DAYS
3. TROUBLE FALLING OR STAYING ASLEEP: NOT AT ALL
1. LITTLE INTEREST OR PLEASURE IN DOING THINGS: NOT AT ALL
9. THOUGHTS THAT YOU WOULD BE BETTER OFF DEAD, OR OF HURTING YOURSELF: NOT AT ALL
2. FEELING DOWN, DEPRESSED OR HOPELESS: SEVERAL DAYS
7. TROUBLE CONCENTRATING ON THINGS, SUCH AS READING THE NEWSPAPER OR WATCHING TELEVISION: NOT AT ALL
5. POOR APPETITE OR OVEREATING: NOT AT ALL
8. MOVING OR SPEAKING SO SLOWLY THAT OTHER PEOPLE COULD HAVE NOTICED. OR THE OPPOSITE, BEING SO FIGETY OR RESTLESS THAT YOU HAVE BEEN MOVING AROUND A LOT MORE THAN USUAL: NOT AT ALL
SUM OF ALL RESPONSES TO PHQ QUESTIONS 1-9: 2
4. FEELING TIRED OR HAVING LITTLE ENERGY: NOT AT ALL
SUM OF ALL RESPONSES TO PHQ9 QUESTIONS 1 & 2: 1
10. IF YOU CHECKED OFF ANY PROBLEMS, HOW DIFFICULT HAVE THESE PROBLEMS MADE IT FOR YOU TO DO YOUR WORK, TAKE CARE OF THINGS AT HOME, OR GET ALONG WITH OTHER PEOPLE: NOT DIFFICULT AT ALL

## 2025-04-01 NOTE — PROGRESS NOTES
[facility]  Progress Note    Type of Interaction: In Office    Start Time: 10:14 am     End Time: 11:14 am     Appointment: Scheduled    Reason for Visit:   Chief Complaint   Patient presents with    Depression    Anxiety    Patient reported decrease in symptoms of depression. She reported feeling less anxious recently. She stated that she has been feeling stressed over her mother's health. She stated she has been feeling better at work and at home. [facility] reviewed coping skills for anxiety with patient to decrease symptoms of anxiety.     Interval History / Patient Symptoms:     Patient Health Questionnaire-9 Score: 2 (4/1/2025 11:24 AM)  PERLITA-7 Total Score: 6 (4/1/2025 11:28 AM)    Interventions Provided: Develop Coping Strategies and Review Progress/Goals Stress Management  [facility] reviewed coping skills for anxiety with patient to decrease symptoms of anxiety- deep breathing, progressive muscle relaxation, challenging irrational thoughts and imagery.     Progress Made: Mixed    Response to Intervention: Patient was engaged in the session.     Plan:   Patient will return in 2 weeks.   Care Plan    There is no care plan documentation to display.         There are no Patient Instructions on file for this visit.    Continue counseling   Follow Up / Next Appointment:    04/14/25 @ 10:30 am

## 2025-04-02 ENCOUNTER — APPOINTMENT (OUTPATIENT)
Dept: PRIMARY CARE | Facility: CLINIC | Age: 65
End: 2025-04-02
Payer: COMMERCIAL

## 2025-04-02 VITALS
OXYGEN SATURATION: 97 % | BODY MASS INDEX: 38.02 KG/M2 | RESPIRATION RATE: 18 BRPM | HEIGHT: 65 IN | TEMPERATURE: 98.2 F | HEART RATE: 69 BPM | SYSTOLIC BLOOD PRESSURE: 120 MMHG | DIASTOLIC BLOOD PRESSURE: 80 MMHG | WEIGHT: 228.2 LBS

## 2025-04-02 DIAGNOSIS — F41.9 ANXIETY: ICD-10-CM

## 2025-04-02 PROCEDURE — 99213 OFFICE O/P EST LOW 20 MIN: CPT | Performed by: FAMILY MEDICINE

## 2025-04-02 PROCEDURE — 3008F BODY MASS INDEX DOCD: CPT | Performed by: FAMILY MEDICINE

## 2025-04-02 PROCEDURE — 1036F TOBACCO NON-USER: CPT | Performed by: FAMILY MEDICINE

## 2025-04-02 RX ORDER — BUSPIRONE HYDROCHLORIDE 15 MG/1
15 TABLET ORAL 3 TIMES DAILY
Qty: 270 TABLET | Refills: 0 | Status: SHIPPED | OUTPATIENT
Start: 2025-04-02 | End: 2026-04-02

## 2025-04-02 ASSESSMENT — PATIENT HEALTH QUESTIONNAIRE - PHQ9
2. FEELING DOWN, DEPRESSED OR HOPELESS: NOT AT ALL
SUM OF ALL RESPONSES TO PHQ9 QUESTIONS 1 AND 2: 0
1. LITTLE INTEREST OR PLEASURE IN DOING THINGS: NOT AT ALL

## 2025-04-02 NOTE — PROGRESS NOTES
"Subjective   Patient ID: Mia Estrella is a 64 y.o. female who presents for Follow-up (Anxiety, bw results, no concerns).  HPI  Feels well, no specific complaints or concerns today.  Doing much better- less anxious  Adding the additional dose of Buspar has helped and also seeing our behav health specialist has been helpful as well   Also takes desvenlafaxine 50 mg daily    Objective   /80   Pulse 69   Temp 36.8 °C (98.2 °F)   Resp 18   Ht 1.651 m (5' 5\")   Wt 104 kg (228 lb 3.2 oz)   SpO2 97%   BMI 37.97 kg/m²    Physical Exam  Reviewed labs: TC a bit elevated (211)   Assessment/Plan   Diagnoses and all orders for this visit:  Anxiety  -     busPIRone (Buspar) 15 mg tablet; Take 1 tablet (15 mg) by mouth 3 times a day.    Continue with the same- Buspar 15 mg tid and desvenlafaxine 50 mg;'    Encouraged her to follow low chol diet     Follow up 3 mos     Tanya Thurston MD  Family Medicine   [facility]  "

## 2025-04-14 ENCOUNTER — APPOINTMENT (OUTPATIENT)
Dept: PRIMARY CARE | Facility: CLINIC | Age: 65
End: 2025-04-14
Payer: COMMERCIAL

## 2025-04-14 DIAGNOSIS — F33.42 RECURRENT MAJOR DEPRESSIVE DISORDER, IN FULL REMISSION: ICD-10-CM

## 2025-04-14 DIAGNOSIS — F41.9 ANXIETY: Primary | ICD-10-CM

## 2025-04-14 ASSESSMENT — PATIENT HEALTH QUESTIONNAIRE - PHQ9
4. FEELING TIRED OR HAVING LITTLE ENERGY: NOT AT ALL
9. THOUGHTS THAT YOU WOULD BE BETTER OFF DEAD, OR OF HURTING YOURSELF: NOT AT ALL
3. TROUBLE FALLING OR STAYING ASLEEP: NOT AT ALL
7. TROUBLE CONCENTRATING ON THINGS, SUCH AS READING THE NEWSPAPER OR WATCHING TELEVISION: NOT AT ALL
6. FEELING BAD ABOUT YOURSELF - OR THAT YOU ARE A FAILURE OR HAVE LET YOURSELF OR YOUR FAMILY DOWN: NOT AT ALL
2. FEELING DOWN, DEPRESSED OR HOPELESS: NOT AT ALL
5. POOR APPETITE OR OVEREATING: NOT AT ALL
SUM OF ALL RESPONSES TO PHQ QUESTIONS 1-9: 0
SUM OF ALL RESPONSES TO PHQ9 QUESTIONS 1 & 2: 0
1. LITTLE INTEREST OR PLEASURE IN DOING THINGS: NOT AT ALL
10. IF YOU CHECKED OFF ANY PROBLEMS, HOW DIFFICULT HAVE THESE PROBLEMS MADE IT FOR YOU TO DO YOUR WORK, TAKE CARE OF THINGS AT HOME, OR GET ALONG WITH OTHER PEOPLE: NOT DIFFICULT AT ALL
8. MOVING OR SPEAKING SO SLOWLY THAT OTHER PEOPLE COULD HAVE NOTICED. OR THE OPPOSITE, BEING SO FIGETY OR RESTLESS THAT YOU HAVE BEEN MOVING AROUND A LOT MORE THAN USUAL: NOT AT ALL

## 2025-04-14 ASSESSMENT — ANXIETY QUESTIONNAIRES
2. NOT BEING ABLE TO STOP OR CONTROL WORRYING: SEVERAL DAYS
1. FEELING NERVOUS, ANXIOUS, OR ON EDGE: NOT AT ALL
IF YOU CHECKED OFF ANY PROBLEMS ON THIS QUESTIONNAIRE, HOW DIFFICULT HAVE THESE PROBLEMS MADE IT FOR YOU TO DO YOUR WORK, TAKE CARE OF THINGS AT HOME, OR GET ALONG WITH OTHER PEOPLE: NOT DIFFICULT AT ALL
4. TROUBLE RELAXING: NOT AT ALL
5. BEING SO RESTLESS THAT IT IS HARD TO SIT STILL: NOT AT ALL
GAD7 TOTAL SCORE: 2
6. BECOMING EASILY ANNOYED OR IRRITABLE: NOT AT ALL
3. WORRYING TOO MUCH ABOUT DIFFERENT THINGS: SEVERAL DAYS
7. FEELING AFRAID AS IF SOMETHING AWFUL MIGHT HAPPEN: NOT AT ALL

## 2025-04-14 NOTE — PROGRESS NOTES
[facility]  [facility]    Type of Interaction: In Office    Start Time: 10:17 am     End Time: 11:02 am     Appointment: Scheduled    Reason for Visit:   Chief Complaint   Patient presents with    Depression    Anxiety    Patient reported that her symptoms of depression and anxiety have decrease. She reported that she has been using her anxiety coping skills to manage her symptoms of anxiety. She reported that her symptoms of depression have diminished. M reviewed grounding skills for anxiety with patient to decrease symptoms of anxiety.     Interval History / Patient Symptoms:     Patient Health Questionnaire-9 Score: 0 (4/14/2025 11:03 AM)  PERLITA-7 Total Score: 2 (4/14/2025 11:03 AM)    Interventions Provided: Develop Coping Strategies and Review Progress/Goals Stress Management  M reviewed grounding skills with patient to decrease symptoms of anxiety. (5-4-3-2-1, categories, body awareness and a mental exercises).     Progress Made: Significant    Response to Intervention: Patient was engaged in the session.    Plan:   Patient schedule for 2 weeks.   Care Plan    There is no care plan documentation to display.         There are no Patient Instructions on file for this visit.    Continue counseling   Follow Up / Next Appointment:    04/14/25 @ 10:30 am

## 2025-04-28 ENCOUNTER — APPOINTMENT (OUTPATIENT)
Dept: PRIMARY CARE | Facility: CLINIC | Age: 65
End: 2025-04-28
Payer: COMMERCIAL

## 2025-04-28 DIAGNOSIS — F33.42 RECURRENT MAJOR DEPRESSIVE DISORDER, IN FULL REMISSION: ICD-10-CM

## 2025-04-28 DIAGNOSIS — F41.9 ANXIETY: Primary | ICD-10-CM

## 2025-04-28 ASSESSMENT — PATIENT HEALTH QUESTIONNAIRE - PHQ9
6. FEELING BAD ABOUT YOURSELF - OR THAT YOU ARE A FAILURE OR HAVE LET YOURSELF OR YOUR FAMILY DOWN: NOT AT ALL
5. POOR APPETITE OR OVEREATING: NOT AT ALL
9. THOUGHTS THAT YOU WOULD BE BETTER OFF DEAD, OR OF HURTING YOURSELF: NOT AT ALL
4. FEELING TIRED OR HAVING LITTLE ENERGY: NOT AT ALL
2. FEELING DOWN, DEPRESSED OR HOPELESS: NOT AT ALL
10. IF YOU CHECKED OFF ANY PROBLEMS, HOW DIFFICULT HAVE THESE PROBLEMS MADE IT FOR YOU TO DO YOUR WORK, TAKE CARE OF THINGS AT HOME, OR GET ALONG WITH OTHER PEOPLE: NOT DIFFICULT AT ALL
8. MOVING OR SPEAKING SO SLOWLY THAT OTHER PEOPLE COULD HAVE NOTICED. OR THE OPPOSITE, BEING SO FIGETY OR RESTLESS THAT YOU HAVE BEEN MOVING AROUND A LOT MORE THAN USUAL: NOT AT ALL
3. TROUBLE FALLING OR STAYING ASLEEP: NOT AT ALL
1. LITTLE INTEREST OR PLEASURE IN DOING THINGS: NOT AT ALL
SUM OF ALL RESPONSES TO PHQ9 QUESTIONS 1 & 2: 0
7. TROUBLE CONCENTRATING ON THINGS, SUCH AS READING THE NEWSPAPER OR WATCHING TELEVISION: NOT AT ALL
SUM OF ALL RESPONSES TO PHQ QUESTIONS 1-9: 0

## 2025-04-28 ASSESSMENT — ANXIETY QUESTIONNAIRES
2. NOT BEING ABLE TO STOP OR CONTROL WORRYING: SEVERAL DAYS
5. BEING SO RESTLESS THAT IT IS HARD TO SIT STILL: NOT AT ALL
IF YOU CHECKED OFF ANY PROBLEMS ON THIS QUESTIONNAIRE, HOW DIFFICULT HAVE THESE PROBLEMS MADE IT FOR YOU TO DO YOUR WORK, TAKE CARE OF THINGS AT HOME, OR GET ALONG WITH OTHER PEOPLE: NOT DIFFICULT AT ALL
6. BECOMING EASILY ANNOYED OR IRRITABLE: NOT AT ALL
7. FEELING AFRAID AS IF SOMETHING AWFUL MIGHT HAPPEN: NOT AT ALL
1. FEELING NERVOUS, ANXIOUS, OR ON EDGE: SEVERAL DAYS
4. TROUBLE RELAXING: NOT AT ALL
GAD7 TOTAL SCORE: 2
3. WORRYING TOO MUCH ABOUT DIFFERENT THINGS: NOT AT ALL

## 2025-04-28 NOTE — PROGRESS NOTES
Collaborative Care ([facility])  Progress Note    Type of Interaction: In Office    Start Time: 10:23 am     End Time: 11:23 am     Appointment: Scheduled    Reason for Visit:   Chief Complaint   Patient presents with    Depression    Anxiety    Patient reported decrease symptoms of depression and anxiety. She reported she has been using her coping skills and relaxation skills to manage her symptoms of depression and anxiety. Patient stated that she has been doing things to recharge her emotional battery- spend time with friend, enjoy spiritual activities and visit family. M reviewed mindfulness activities with patient to decrease symptoms of anxiety.    Interval History / Patient Symptoms:     Patient Health Questionnaire-9 Score: 0 (4/28/2025 11:15 AM)  PERLITA-7 Total Score: 2 (4/14/2025 11:03 AM)    Interventions Provided: Develop Coping Strategies and Review Progress/Goals Stress Management  [facility] reviewed mindfulness activities- mindfulness meditation, body scan, mindfulness eating and fives senses.     Progress Made: Significant    Response to Intervention: Patient was engaged in the session.     Plan:   Patient is scheduled in 3 weeks   Care Plan    There is no care plan documentation to display.         There are no Patient Instructions on file for this visit.    Continue counseling    Follow Up / Next Appointment:    05/19/25 @ 10:30 am

## 2025-04-29 ENCOUNTER — DOCUMENTATION (OUTPATIENT)
Dept: PRIMARY CARE | Facility: CLINIC | Age: 65
End: 2025-04-29
Payer: COMMERCIAL

## 2025-04-29 DIAGNOSIS — F41.9 ANXIETY: Primary | ICD-10-CM

## 2025-04-29 DIAGNOSIS — F33.42 RECURRENT MAJOR DEPRESSIVE DISORDER, IN FULL REMISSION: ICD-10-CM

## 2025-05-19 ENCOUNTER — APPOINTMENT (OUTPATIENT)
Dept: PRIMARY CARE | Facility: CLINIC | Age: 65
End: 2025-05-19
Payer: COMMERCIAL

## 2025-05-19 DIAGNOSIS — F41.9 ANXIETY: Primary | ICD-10-CM

## 2025-05-19 DIAGNOSIS — F33.42 RECURRENT MAJOR DEPRESSIVE DISORDER, IN FULL REMISSION: ICD-10-CM

## 2025-05-19 ASSESSMENT — ANXIETY QUESTIONNAIRES
1. FEELING NERVOUS, ANXIOUS, OR ON EDGE: NOT AT ALL
7. FEELING AFRAID AS IF SOMETHING AWFUL MIGHT HAPPEN: NOT AT ALL
2. NOT BEING ABLE TO STOP OR CONTROL WORRYING: NOT AT ALL
3. WORRYING TOO MUCH ABOUT DIFFERENT THINGS: NOT AT ALL
IF YOU CHECKED OFF ANY PROBLEMS ON THIS QUESTIONNAIRE, HOW DIFFICULT HAVE THESE PROBLEMS MADE IT FOR YOU TO DO YOUR WORK, TAKE CARE OF THINGS AT HOME, OR GET ALONG WITH OTHER PEOPLE: NOT DIFFICULT AT ALL
4. TROUBLE RELAXING: NOT AT ALL
GAD7 TOTAL SCORE: 0
6. BECOMING EASILY ANNOYED OR IRRITABLE: NOT AT ALL
5. BEING SO RESTLESS THAT IT IS HARD TO SIT STILL: NOT AT ALL

## 2025-05-19 ASSESSMENT — PATIENT HEALTH QUESTIONNAIRE - PHQ9
9. THOUGHTS THAT YOU WOULD BE BETTER OFF DEAD, OR OF HURTING YOURSELF: NOT AT ALL
SUM OF ALL RESPONSES TO PHQ9 QUESTIONS 1 & 2: 0
6. FEELING BAD ABOUT YOURSELF - OR THAT YOU ARE A FAILURE OR HAVE LET YOURSELF OR YOUR FAMILY DOWN: NOT AT ALL
10. IF YOU CHECKED OFF ANY PROBLEMS, HOW DIFFICULT HAVE THESE PROBLEMS MADE IT FOR YOU TO DO YOUR WORK, TAKE CARE OF THINGS AT HOME, OR GET ALONG WITH OTHER PEOPLE: NOT DIFFICULT AT ALL
SUM OF ALL RESPONSES TO PHQ QUESTIONS 1-9: 0
3. TROUBLE FALLING OR STAYING ASLEEP: NOT AT ALL
7. TROUBLE CONCENTRATING ON THINGS, SUCH AS READING THE NEWSPAPER OR WATCHING TELEVISION: NOT AT ALL
8. MOVING OR SPEAKING SO SLOWLY THAT OTHER PEOPLE COULD HAVE NOTICED. OR THE OPPOSITE, BEING SO FIGETY OR RESTLESS THAT YOU HAVE BEEN MOVING AROUND A LOT MORE THAN USUAL: NOT AT ALL
1. LITTLE INTEREST OR PLEASURE IN DOING THINGS: NOT AT ALL
5. POOR APPETITE OR OVEREATING: NOT AT ALL
4. FEELING TIRED OR HAVING LITTLE ENERGY: NOT AT ALL
2. FEELING DOWN, DEPRESSED OR HOPELESS: NOT AT ALL

## 2025-05-19 NOTE — PROGRESS NOTES
[facility]  [facility]    Type of Interaction: In Office    Start Time: 10:25 am     End Time: 11:25 am    Appointment: Scheduled    Reason for Visit:   Chief Complaint   Patient presents with    Depression    Anxiety    Patient reported she has been feeling better and her symptoms of depression and anxiety have decreased. She reported that she has been managing them through her coping skills.   Patient stated that she and her  are going to move into her friend's house with her after they get the house cleaned up and organized. Patient was positive and upbeat. Patient stated her medication and counseling have been helpful for her depression and anxiety symptoms. [facility] reviewed coping skills for depression.     Interval History / Patient Symptoms:     Patient Health Questionnaire-9 Score: 0 (5/19/2025 11:33 AM)  PERLITA-7 Total Score: 0 (5/19/2025 11:35 AM)    Interventions Provided: Develop Coping Strategies and Review Progress/Goals Stress Management  [facility] reviewed coping skills for depression- behavioral activation, social support, three good things and mindfulness to decrease symptoms of depression.     Progress Made: Significant    Response to Intervention: Patient was engaged in the session.     Plan:   Patient is scheduled in  weeks.     Care Plan    There is no care plan documentation to display.         There are no Patient Instructions on file for this visit.    Continue counseling   Follow Up / Next Appointment:    06/16/25 @ 10:30 am

## 2025-05-29 ENCOUNTER — DOCUMENTATION (OUTPATIENT)
Dept: PRIMARY CARE | Facility: CLINIC | Age: 65
End: 2025-05-29
Payer: COMMERCIAL

## 2025-05-29 DIAGNOSIS — F41.9 ANXIETY: Primary | ICD-10-CM

## 2025-05-29 DIAGNOSIS — F33.42 RECURRENT MAJOR DEPRESSIVE DISORDER, IN FULL REMISSION: ICD-10-CM

## 2025-06-16 ENCOUNTER — APPOINTMENT (OUTPATIENT)
Dept: PRIMARY CARE | Facility: CLINIC | Age: 65
End: 2025-06-16
Payer: COMMERCIAL

## 2025-07-01 ENCOUNTER — APPOINTMENT (OUTPATIENT)
Dept: PRIMARY CARE | Facility: CLINIC | Age: 65
End: 2025-07-01
Payer: COMMERCIAL

## 2025-07-03 DIAGNOSIS — F32.A DEPRESSION, UNSPECIFIED DEPRESSION TYPE: ICD-10-CM

## 2025-07-03 DIAGNOSIS — F41.9 ANXIETY: ICD-10-CM

## 2025-07-03 DIAGNOSIS — K21.9 GASTROESOPHAGEAL REFLUX DISEASE WITHOUT ESOPHAGITIS: ICD-10-CM

## 2025-07-03 RX ORDER — DESVENLAFAXINE 50 MG/1
50 TABLET, EXTENDED RELEASE ORAL DAILY
Qty: 30 TABLET | Refills: 0 | Status: SHIPPED | OUTPATIENT
Start: 2025-07-03

## 2025-07-03 RX ORDER — PANTOPRAZOLE SODIUM 40 MG/1
40 TABLET, DELAYED RELEASE ORAL
Qty: 30 TABLET | Refills: 0 | Status: SHIPPED | OUTPATIENT
Start: 2025-07-03

## 2025-07-03 NOTE — TELEPHONE ENCOUNTER
Mia called for refills: She has cancelled several appointments so I let her know, if refills are sent, it will only be to get her to her appointment.     Desvenlafaxine 50 mg  Pantoprazole 40 mg    GARETT Quiroz    Last appt: 04/02/25  Next appt: 07/21/25  Labs: 04/01/25

## 2025-07-21 ENCOUNTER — APPOINTMENT (OUTPATIENT)
Dept: PRIMARY CARE | Facility: CLINIC | Age: 65
End: 2025-07-21
Payer: COMMERCIAL

## 2025-07-21 VITALS
DIASTOLIC BLOOD PRESSURE: 80 MMHG | RESPIRATION RATE: 18 BRPM | OXYGEN SATURATION: 97 % | HEART RATE: 63 BPM | HEIGHT: 65 IN | BODY MASS INDEX: 38.47 KG/M2 | TEMPERATURE: 98.6 F | SYSTOLIC BLOOD PRESSURE: 122 MMHG | WEIGHT: 230.9 LBS

## 2025-07-21 DIAGNOSIS — F41.9 ANXIETY: Primary | ICD-10-CM

## 2025-07-21 DIAGNOSIS — K21.9 GASTROESOPHAGEAL REFLUX DISEASE WITHOUT ESOPHAGITIS: ICD-10-CM

## 2025-07-21 DIAGNOSIS — F32.A DEPRESSION, UNSPECIFIED DEPRESSION TYPE: ICD-10-CM

## 2025-07-21 PROBLEM — E66.01 SEVERE OBESITY (MULTI): Status: ACTIVE | Noted: 2025-07-21

## 2025-07-21 PROBLEM — S93.409A SPRAIN OF ANKLE: Status: ACTIVE | Noted: 2025-07-21

## 2025-07-21 PROBLEM — M25.569 KNEE PAIN: Status: ACTIVE | Noted: 2023-09-19

## 2025-07-21 PROBLEM — M17.12 OSTEOARTHRITIS OF LEFT PATELLOFEMORAL JOINT: Status: ACTIVE | Noted: 2023-09-19

## 2025-07-21 PROBLEM — L98.9 SKIN LESION: Status: ACTIVE | Noted: 2025-07-21

## 2025-07-21 PROCEDURE — 3008F BODY MASS INDEX DOCD: CPT | Performed by: FAMILY MEDICINE

## 2025-07-21 PROCEDURE — 99213 OFFICE O/P EST LOW 20 MIN: CPT | Performed by: FAMILY MEDICINE

## 2025-07-21 PROCEDURE — 1036F TOBACCO NON-USER: CPT | Performed by: FAMILY MEDICINE

## 2025-07-21 RX ORDER — BUSPIRONE HYDROCHLORIDE 15 MG/1
15 TABLET ORAL 3 TIMES DAILY
Qty: 270 TABLET | Refills: 1 | Status: SHIPPED | OUTPATIENT
Start: 2025-07-21 | End: 2026-07-21

## 2025-07-21 RX ORDER — DESVENLAFAXINE 50 MG/1
50 TABLET, EXTENDED RELEASE ORAL DAILY
Qty: 90 TABLET | Refills: 1 | Status: SHIPPED | OUTPATIENT
Start: 2025-07-21

## 2025-07-21 RX ORDER — PANTOPRAZOLE SODIUM 40 MG/1
40 TABLET, DELAYED RELEASE ORAL
Qty: 90 TABLET | Refills: 1 | Status: SHIPPED | OUTPATIENT
Start: 2025-07-21

## 2025-07-21 ASSESSMENT — ENCOUNTER SYMPTOMS
LOSS OF SENSATION IN FEET: 0
OCCASIONAL FEELINGS OF UNSTEADINESS: 0
DEPRESSION: 0

## 2025-07-21 ASSESSMENT — PATIENT HEALTH QUESTIONNAIRE - PHQ9: 2. FEELING DOWN, DEPRESSED OR HOPELESS: NOT AT ALL

## 2025-07-21 NOTE — PROGRESS NOTES
"Subjective   Patient ID: Mia Estrella is a 64 y.o. female who presents for Follow-up (EPV, F/U medications/).    History of Present Illness  Mia Estrella is a 64 year old female who presents for medication refills/ follow up anxiety     She is currently taking Pristiq once daily and Buspar three times a day for anxiety. She finds her previous therapy sessions with [facility] helpful and has been able to manage her anxiety better, including making decisions at work that prioritize her mental health.    She also takes pantoprazole (Protonix) for acid reflux.    Objective     /80 (BP Location: Left arm, Patient Position: Sitting)   Pulse 63   Temp 37 °C (98.6 °F) (Oral)   Resp 18   Ht 1.651 m (5' 5\")   Wt 105 kg (230 lb 14.4 oz)   SpO2 97%   BMI 38.42 kg/m²      Physical Exam  Alert, well-appearing.    CVS: RRR, no murmurs.     Respiratory: Clear and equal breath sounds.    GI: Soft, nontender.     Assessment/Plan     Diagnoses and all orders for this visit:  Anxiety  -     desvenlafaxine 50 mg 24 hr tablet; Take 1 tablet (50 mg) by mouth once daily.  -     busPIRone (Buspar) 15 mg tablet; Take 1 tablet (15 mg) by mouth 3 times a day.  Depression, unspecified depression type  -     desvenlafaxine 50 mg 24 hr tablet; Take 1 tablet (50 mg) by mouth once daily.  Gastroesophageal reflux disease without esophagitis  -     pantoprazole (ProtoNix) 40 mg EC tablet; Take 1 tablet (40 mg) by mouth once daily in the morning. Take before meals. Do not crush, chew, or split.    Assessment & Plan  Anxiety  Significant improvement.   - Continue Buspirone 15 mg oral tid.  - Continue Desvenlafaxine 50 mg daily.    Acid Reflux  - Continue pantoprazole as prescribed.    - Schedule a physical examination in December or early January.      Tanya Thurston MD    This medical note was created with the assistance of artificial intelligence (AI) for documentation purposes. The content has been reviewed and confirmed by the [facility] " provider for accuracy and completeness. Patient consented to the use of audio recording and use of AI during their visit.

## 2025-08-20 ENCOUNTER — TELEPHONE (OUTPATIENT)
Dept: PRIMARY CARE | Facility: CLINIC | Age: 65
End: 2025-08-20
Payer: COMMERCIAL

## 2025-12-19 ENCOUNTER — APPOINTMENT (OUTPATIENT)
Dept: PRIMARY CARE | Facility: CLINIC | Age: 65
End: 2025-12-19
Payer: COMMERCIAL